# Patient Record
Sex: MALE | Race: WHITE | NOT HISPANIC OR LATINO | URBAN - METROPOLITAN AREA
[De-identification: names, ages, dates, MRNs, and addresses within clinical notes are randomized per-mention and may not be internally consistent; named-entity substitution may affect disease eponyms.]

---

## 2019-05-23 ENCOUNTER — EMERGENCY (EMERGENCY)
Facility: HOSPITAL | Age: 74
LOS: 1 days | Discharge: HOME | End: 2019-05-23
Attending: EMERGENCY MEDICINE | Admitting: EMERGENCY MEDICINE
Payer: MEDICARE

## 2019-05-23 VITALS
HEIGHT: 68 IN | HEART RATE: 77 BPM | SYSTOLIC BLOOD PRESSURE: 145 MMHG | RESPIRATION RATE: 20 BRPM | OXYGEN SATURATION: 95 % | WEIGHT: 227.08 LBS | TEMPERATURE: 99 F | DIASTOLIC BLOOD PRESSURE: 83 MMHG

## 2019-05-23 VITALS
SYSTOLIC BLOOD PRESSURE: 113 MMHG | RESPIRATION RATE: 18 BRPM | DIASTOLIC BLOOD PRESSURE: 66 MMHG | OXYGEN SATURATION: 95 % | HEART RATE: 88 BPM | TEMPERATURE: 98 F

## 2019-05-23 DIAGNOSIS — R06.02 SHORTNESS OF BREATH: ICD-10-CM

## 2019-05-23 DIAGNOSIS — J45.901 UNSPECIFIED ASTHMA WITH (ACUTE) EXACERBATION: ICD-10-CM

## 2019-05-23 LAB
ALBUMIN SERPL ELPH-MCNC: 4 G/DL — SIGNIFICANT CHANGE UP (ref 3.3–5)
ALP SERPL-CCNC: 53 U/L — SIGNIFICANT CHANGE UP (ref 40–120)
ALT FLD-CCNC: 31 U/L — SIGNIFICANT CHANGE UP (ref 10–45)
ANION GAP SERPL CALC-SCNC: 12 MMOL/L — SIGNIFICANT CHANGE UP (ref 5–17)
AST SERPL-CCNC: 22 U/L — SIGNIFICANT CHANGE UP (ref 10–40)
BASOPHILS # BLD AUTO: 0.01 K/UL — SIGNIFICANT CHANGE UP (ref 0–0.2)
BASOPHILS NFR BLD AUTO: 0.2 % — SIGNIFICANT CHANGE UP (ref 0–2)
BILIRUB SERPL-MCNC: 0.4 MG/DL — SIGNIFICANT CHANGE UP (ref 0.2–1.2)
BUN SERPL-MCNC: 11 MG/DL — SIGNIFICANT CHANGE UP (ref 7–23)
CALCIUM SERPL-MCNC: 9.3 MG/DL — SIGNIFICANT CHANGE UP (ref 8.4–10.5)
CHLORIDE SERPL-SCNC: 103 MMOL/L — SIGNIFICANT CHANGE UP (ref 96–108)
CO2 SERPL-SCNC: 23 MMOL/L — SIGNIFICANT CHANGE UP (ref 22–31)
CREAT SERPL-MCNC: 0.68 MG/DL — SIGNIFICANT CHANGE UP (ref 0.5–1.3)
EOSINOPHIL # BLD AUTO: 0.03 K/UL — SIGNIFICANT CHANGE UP (ref 0–0.5)
EOSINOPHIL NFR BLD AUTO: 0.7 % — SIGNIFICANT CHANGE UP (ref 0–6)
GLUCOSE SERPL-MCNC: 119 MG/DL — HIGH (ref 70–99)
HCT VFR BLD CALC: 42 % — SIGNIFICANT CHANGE UP (ref 39–50)
HGB BLD-MCNC: 13.8 G/DL — SIGNIFICANT CHANGE UP (ref 13–17)
IMM GRANULOCYTES NFR BLD AUTO: 0.2 % — SIGNIFICANT CHANGE UP (ref 0–1.5)
LYMPHOCYTES # BLD AUTO: 1.11 K/UL — SIGNIFICANT CHANGE UP (ref 1–3.3)
LYMPHOCYTES # BLD AUTO: 26.5 % — SIGNIFICANT CHANGE UP (ref 13–44)
MCHC RBC-ENTMCNC: 28 PG — SIGNIFICANT CHANGE UP (ref 27–34)
MCHC RBC-ENTMCNC: 32.9 GM/DL — SIGNIFICANT CHANGE UP (ref 32–36)
MCV RBC AUTO: 85.4 FL — SIGNIFICANT CHANGE UP (ref 80–100)
MONOCYTES # BLD AUTO: 0.57 K/UL — SIGNIFICANT CHANGE UP (ref 0–0.9)
MONOCYTES NFR BLD AUTO: 13.6 % — SIGNIFICANT CHANGE UP (ref 2–14)
NEUTROPHILS # BLD AUTO: 2.46 K/UL — SIGNIFICANT CHANGE UP (ref 1.8–7.4)
NEUTROPHILS NFR BLD AUTO: 58.8 % — SIGNIFICANT CHANGE UP (ref 43–77)
NRBC # BLD: 0 /100 WBCS — SIGNIFICANT CHANGE UP (ref 0–0)
PLATELET # BLD AUTO: 176 K/UL — SIGNIFICANT CHANGE UP (ref 150–400)
POTASSIUM SERPL-MCNC: 4.1 MMOL/L — SIGNIFICANT CHANGE UP (ref 3.5–5.3)
POTASSIUM SERPL-SCNC: 4.1 MMOL/L — SIGNIFICANT CHANGE UP (ref 3.5–5.3)
PROT SERPL-MCNC: 7.2 G/DL — SIGNIFICANT CHANGE UP (ref 6–8.3)
RBC # BLD: 4.92 M/UL — SIGNIFICANT CHANGE UP (ref 4.2–5.8)
RBC # FLD: 14.6 % — HIGH (ref 10.3–14.5)
SODIUM SERPL-SCNC: 138 MMOL/L — SIGNIFICANT CHANGE UP (ref 135–145)
WBC # BLD: 4.19 K/UL — SIGNIFICANT CHANGE UP (ref 3.8–10.5)
WBC # FLD AUTO: 4.19 K/UL — SIGNIFICANT CHANGE UP (ref 3.8–10.5)

## 2019-05-23 PROCEDURE — 85025 COMPLETE CBC W/AUTO DIFF WBC: CPT

## 2019-05-23 PROCEDURE — 93010 ELECTROCARDIOGRAM REPORT: CPT

## 2019-05-23 PROCEDURE — 99284 EMERGENCY DEPT VISIT MOD MDM: CPT | Mod: 25

## 2019-05-23 PROCEDURE — 94640 AIRWAY INHALATION TREATMENT: CPT

## 2019-05-23 PROCEDURE — 80053 COMPREHEN METABOLIC PANEL: CPT

## 2019-05-23 PROCEDURE — 71046 X-RAY EXAM CHEST 2 VIEWS: CPT | Mod: 26

## 2019-05-23 PROCEDURE — 36415 COLL VENOUS BLD VENIPUNCTURE: CPT

## 2019-05-23 PROCEDURE — 99285 EMERGENCY DEPT VISIT HI MDM: CPT | Mod: 25

## 2019-05-23 PROCEDURE — 93005 ELECTROCARDIOGRAM TRACING: CPT

## 2019-05-23 PROCEDURE — 96374 THER/PROPH/DIAG INJ IV PUSH: CPT

## 2019-05-23 PROCEDURE — 71046 X-RAY EXAM CHEST 2 VIEWS: CPT

## 2019-05-23 RX ORDER — ALBUTEROL 90 UG/1
2 AEROSOL, METERED ORAL
Qty: 1 | Refills: 0
Start: 2019-05-23 | End: 2019-05-29

## 2019-05-23 RX ORDER — IPRATROPIUM/ALBUTEROL SULFATE 18-103MCG
3 AEROSOL WITH ADAPTER (GRAM) INHALATION
Refills: 0 | Status: COMPLETED | OUTPATIENT
Start: 2019-05-23 | End: 2019-05-23

## 2019-05-23 RX ORDER — CLARITHROMYCIN 500 MG
1 TABLET ORAL
Qty: 6 | Refills: 0
Start: 2019-05-23 | End: 2019-05-27

## 2019-05-23 RX ADMIN — Medication 100 MILLIGRAM(S): at 11:03

## 2019-05-23 RX ADMIN — Medication 3 MILLILITER(S): at 11:06

## 2019-05-23 RX ADMIN — Medication 3 MILLILITER(S): at 11:08

## 2019-05-23 RX ADMIN — Medication 125 MILLIGRAM(S): at 10:51

## 2019-05-23 RX ADMIN — Medication 3 MILLILITER(S): at 11:04
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
## 2019-05-23 NOTE — ED PROVIDER NOTE - MUSCULOSKELETAL, MLM
Spine appears normal, range of motion is not limited, no muscle or joint tenderness; No LE edema b/l

## 2019-05-23 NOTE — ED PROVIDER NOTE - OBJECTIVE STATEMENT
The pt is a 73 y/o M, who presents to ED c/o cough x 1 wk. Pt went on a cruise and developed cough while there, cough getting worse over past wk, now triggering his asthma (induced by working at [employer] yrs ago) - c/o wheezing and chest tightness, has been using his MDi w/min relief. Cough is productive w/clear to yellow sputum, + coughing fits. Denies cp, palpitations, fevers, chills, n/v/d, abd pain, leg pain or swelling

## 2019-05-23 NOTE — ED ADULT NURSE NOTE - OBJECTIVE STATEMENT
Pt alert and oriented X3. pt coming from home due to worsening productive cough that started Saturday. Greenish-clear sputum per pt report. Pt came back from a cruise on Saturday. Pt denies generalized weakness but states yesterday after cooking meal and doing chores felt really weak and fatigue. Pt denies any weakness, fever SOB or chest pain. pt contacted his PCP this morning who recommended him to come to the ER.

## 2019-05-23 NOTE — ED ADULT NURSE NOTE - CHPI ED NUR SYMPTOMS NEG
no shortness of breath/no fever/no headache/no diaphoresis/no chills/no edema/no body aches/no chest pain/no hemoptysis
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
## 2019-05-23 NOTE — ED ADULT NURSE NOTE - NSIMPLEMENTINTERV_GEN_ALL_ED
Implemented All Universal Safety Interventions:  [place] to call system. Call bell, personal items and telephone within reach. Instruct patient to call for assistance. Room bathroom lighting operational. Non-slip footwear when patient is off stretcher. Physically safe environment: no spills, clutter or unnecessary equipment. Stretcher in lowest position, wheels locked, appropriate side rails in place.

## 2019-05-23 NOTE — ED PROVIDER NOTE - CLINICAL SUMMARY MEDICAL DECISION MAKING FREE TEXT BOX
pt w/hx of asthma (induced by working at [employer] - followed at [facility] - has mdi, been on steroids, never intubated). c/o cough and wheezing, no resp distress but + wheezing and rhonchi on initial exam, pt given nebs and steroids and cough meds - much improved, cxr w/o i/e, labs wnl, will dc w/steroid burst and cough meds, will cover w/zpack given duration of symptoms, f/u w/pmd, pt understands and agrees w/plan

## 2019-06-03 PROBLEM — J45.909 UNSPECIFIED ASTHMA, UNCOMPLICATED: Chronic | Status: ACTIVE | Noted: 2019-05-23

## 2019-07-01 ENCOUNTER — APPOINTMENT (OUTPATIENT)
Dept: PULMONOLOGY | Facility: CLINIC | Age: 74
End: 2019-07-01
Payer: MEDICARE

## 2019-07-01 VITALS
OXYGEN SATURATION: 97 % | HEIGHT: 68 IN | SYSTOLIC BLOOD PRESSURE: 128 MMHG | HEART RATE: 115 BPM | WEIGHT: 220 LBS | BODY MASS INDEX: 33.34 KG/M2 | TEMPERATURE: 98.4 F | DIASTOLIC BLOOD PRESSURE: 80 MMHG

## 2019-07-01 DIAGNOSIS — Z87.891 PERSONAL HISTORY OF NICOTINE DEPENDENCE: ICD-10-CM

## 2019-07-01 DIAGNOSIS — J44.9 CHRONIC OBSTRUCTIVE PULMONARY DISEASE, UNSPECIFIED: ICD-10-CM

## 2019-07-01 DIAGNOSIS — Z82.5 FAMILY HISTORY OF ASTHMA AND OTHER CHRONIC LOWER RESPIRATORY DISEASES: ICD-10-CM

## 2019-07-01 PROCEDURE — 99204 OFFICE O/P NEW MOD 45 MIN: CPT | Mod: 25

## 2019-07-01 PROCEDURE — 94010 BREATHING CAPACITY TEST: CPT

## 2019-07-01 RX ORDER — QUINAPRIL HYDROCHLORIDE 5 MG/1
TABLET, FILM COATED ORAL
Refills: 0 | Status: ACTIVE | COMMUNITY

## 2019-07-01 RX ORDER — TAMSULOSIN HYDROCHLORIDE 0.4 MG/1
CAPSULE ORAL
Refills: 0 | Status: ACTIVE | COMMUNITY

## 2019-07-01 RX ORDER — FLUTICASONE FUROATE AND VILANTEROL TRIFENATATE 100; 25 UG/1; UG/1
100-25 POWDER RESPIRATORY (INHALATION)
Refills: 0 | Status: ACTIVE | COMMUNITY

## 2019-07-01 RX ORDER — TIOTROPIUM BROMIDE INHALATION SPRAY 1.56 UG/1
1.25 SPRAY, METERED RESPIRATORY (INHALATION)
Refills: 0 | Status: ACTIVE | COMMUNITY

## 2019-07-01 NOTE — ASSESSMENT
[FreeTextEntry1] : Data reviewed:\par \par Jamie 07/01/2019 : mod obstruction, FEV1 59%\par \par Impression:\par Obstructive lung disease - perhaps asthma-COPD overlap given the history\par \par Plan:\par This pleasant man has obstructive lung disease with a recent exacerbation, but he doesn't have frequent exacerbations and he's asymptomatic today, and he gets his care free of charge at the [facility]. He's being evaluated there in 10 days. I will defer doing any further evaluation now at his expense (ie full PFT). He should get the eval at the [address] [place] [address], and he can return to me any time I can be of service.

## 2019-07-01 NOTE — CONSULT LETTER
[Dear  ___] : Dear  [unfilled], [( Thank you for referring [unfilled] for consultation for _____ )] : Thank you for referring [unfilled] for consultation for [unfilled] [Please see my note below.] : Please see my note below. [Consult Closing:] : Thank you very much for allowing me to participate in the care of this patient.  If you have any questions, please do not hesitate to contact me. [Sincerely,] : Sincerely, [FreeTextEntry2] : Jasper Sutherland MD\par [address]\[place] [address] [FreeTextEntry3] : Kathy Bautista MD, FCCP\par

## 2021-06-10 ENCOUNTER — APPOINTMENT (OUTPATIENT)
Dept: ORTHOPEDIC SURGERY | Facility: CLINIC | Age: 76
End: 2021-06-10
Payer: MEDICARE

## 2021-06-10 VITALS — WEIGHT: 220 LBS | HEIGHT: 68 IN | BODY MASS INDEX: 33.34 KG/M2

## 2021-06-10 DIAGNOSIS — Z87.898 PERSONAL HISTORY OF OTHER SPECIFIED CONDITIONS: ICD-10-CM

## 2021-06-10 DIAGNOSIS — Z78.9 OTHER SPECIFIED HEALTH STATUS: ICD-10-CM

## 2021-06-10 DIAGNOSIS — M65.342 TRIGGER FINGER, LEFT RING FINGER: ICD-10-CM

## 2021-06-10 PROCEDURE — 73120 X-RAY EXAM OF HAND: CPT | Mod: 50

## 2021-06-10 PROCEDURE — 99203 OFFICE O/P NEW LOW 30 MIN: CPT | Mod: 25

## 2021-06-10 PROCEDURE — 20550 NJX 1 TENDON SHEATH/LIGAMENT: CPT | Mod: F3

## 2021-06-10 RX ORDER — UMECLIDINIUM 62.5 UG/1
AEROSOL, POWDER ORAL
Refills: 0 | Status: ACTIVE | COMMUNITY

## 2021-06-10 RX ORDER — FLUTICASONE FUROATE AND VILANTEROL TRIFENATATE 200; 25 UG/1; UG/1
200-25 POWDER RESPIRATORY (INHALATION)
Refills: 0 | Status: ACTIVE | COMMUNITY

## 2021-10-06 PROBLEM — J44.9 OBSTRUCTIVE LUNG DISEASE: Status: ACTIVE | Noted: 2019-07-01

## 2021-12-01 ENCOUNTER — APPOINTMENT (OUTPATIENT)
Dept: ORTHOPEDIC SURGERY | Facility: CLINIC | Age: 76
End: 2021-12-01
Payer: MEDICARE

## 2021-12-01 VITALS — WEIGHT: 220 LBS | RESPIRATION RATE: 14 BRPM | HEIGHT: 68 IN | BODY MASS INDEX: 33.34 KG/M2

## 2021-12-01 DIAGNOSIS — M65.342 TRIGGER FINGER, LEFT RING FINGER: ICD-10-CM

## 2021-12-01 PROCEDURE — 20550 NJX 1 TENDON SHEATH/LIGAMENT: CPT | Mod: F3

## 2021-12-01 PROCEDURE — 99213 OFFICE O/P EST LOW 20 MIN: CPT | Mod: 25

## 2021-12-04 ENCOUNTER — EMERGENCY (EMERGENCY)
Facility: HOSPITAL | Age: 76
LOS: 1 days | Discharge: HOME | End: 2021-12-04
Attending: EMERGENCY MEDICINE | Admitting: EMERGENCY MEDICINE
Payer: MEDICARE

## 2021-12-04 VITALS
HEART RATE: 116 BPM | OXYGEN SATURATION: 97 % | SYSTOLIC BLOOD PRESSURE: 114 MMHG | TEMPERATURE: 98 F | DIASTOLIC BLOOD PRESSURE: 83 MMHG | RESPIRATION RATE: 18 BRPM

## 2021-12-04 VITALS
TEMPERATURE: 98 F | DIASTOLIC BLOOD PRESSURE: 98 MMHG | HEIGHT: 68 IN | SYSTOLIC BLOOD PRESSURE: 164 MMHG | OXYGEN SATURATION: 98 % | RESPIRATION RATE: 16 BRPM | HEART RATE: 104 BPM | WEIGHT: 220.02 LBS

## 2021-12-04 DIAGNOSIS — K59.00 CONSTIPATION, UNSPECIFIED: ICD-10-CM

## 2021-12-04 DIAGNOSIS — J45.909 UNSPECIFIED ASTHMA, UNCOMPLICATED: ICD-10-CM

## 2021-12-04 DIAGNOSIS — R10.30 LOWER ABDOMINAL PAIN, UNSPECIFIED: ICD-10-CM

## 2021-12-04 DIAGNOSIS — R11.10 VOMITING, UNSPECIFIED: ICD-10-CM

## 2021-12-04 DIAGNOSIS — I10 ESSENTIAL (PRIMARY) HYPERTENSION: ICD-10-CM

## 2021-12-04 DIAGNOSIS — R14.0 ABDOMINAL DISTENSION (GASEOUS): ICD-10-CM

## 2021-12-04 PROCEDURE — 74019 RADEX ABDOMEN 2 VIEWS: CPT | Mod: 26

## 2021-12-04 PROCEDURE — 99283 EMERGENCY DEPT VISIT LOW MDM: CPT | Mod: 25

## 2021-12-04 RX ORDER — GLYCERIN ADULT
1 SUPPOSITORY, RECTAL RECTAL ONCE
Refills: 0 | Status: COMPLETED | OUTPATIENT
Start: 2021-12-04 | End: 2021-12-04

## 2021-12-04 RX ORDER — MULTIVIT WITH MIN/MFOLATE/K2 340-15/3 G
1 POWDER (GRAM) ORAL ONCE
Refills: 0 | Status: COMPLETED | OUTPATIENT
Start: 2021-12-04 | End: 2021-12-04

## 2021-12-04 RX ADMIN — Medication 1 BOTTLE: at 11:11

## 2021-12-04 RX ADMIN — Medication 1 SUPPOSITORY(S): at 11:42

## 2021-12-04 NOTE — ED PROVIDER NOTE - NSFOLLOWUPINSTRUCTIONS_ED_ALL_ED_FT
May use Miralax 17g daily for one week.    Constipation    Constipation is when a person has fewer than three bowel movements a week, has difficulty having a bowel movement, or has stools that are dry, hard, or larger than normal. Other symptoms can include abdominal pain or bloating. As people grow older, constipation is more common. A low-fiber diet, not taking in enough fluids, and taking certain medicines, including opioid painkillers, may make constipation worse. Treatment varies but may include dietary modifications (more fiber-rich foods), lifestyle modifications, and possible medications.     SEEK IMMEDIATE MEDICAL CARE IF YOU HAVE ANY OF THE FOLLOWING SYMPTOMS: bright red blood in your stool, constipation for longer than 4 days, abdominal or rectal pain, unexplained weight loss, or inability to pass gas.

## 2021-12-04 NOTE — ED PROVIDER NOTE - OBJECTIVE STATEMENT
75 yo hx of asthma, htn with complaints of constipation for three days, has been passing flatus, last this am. No vomiting, some mild lower abd discomfort. Has tried one tab of colace, two tabs of dulcolace and water enema. Last time pt  has these symptoms was on a keto diet, not on it currently. no GI bleed sx, fever, abdominal surgeries. 75 yo hx of asthma, htn with complaints of constipation for three days, has been passing flatus, last this am. No vomiting, some mild lower abd discomfort. Has tried one tab of colace, two tabs of Dulcolax and water enema. Last time pt  has these symptoms was on a keto diet, not on it currently. no GI bleed sx, fever, abdominal surgeries.

## 2021-12-04 NOTE — ED PROVIDER NOTE - PATIENT PORTAL LINK FT
You can access the FollowMyHealth Patient Portal offered by [facility] by registering at the following website: [email or web address]. By joining [facility]’s FollowMyHealth portal, you will also be able to view your health information using other applications (apps) compatible with our system.

## 2021-12-04 NOTE — ED ADULT NURSE NOTE - NSFALLRSKASSESSTYPE_ED_ALL_ED
Initial (On Arrival) Carac Counseling:  I discussed with the patient the risks of Carac including but not limited to erythema, scaling, itching, weeping, crusting, and pain.

## 2021-12-04 NOTE — ED PROVIDER NOTE - CLINICAL SUMMARY MEDICAL DECISION MAKING FREE TEXT BOX
Pt w constipation, no air fluid levels on XR, no active vomiting,abd soft, will trial meds- Mgcitrate/enema and reassess.

## 2021-12-04 NOTE — ED PROVIDER NOTE - PROGRESS NOTE DETAILS
Pt w bm, no vomiting in ED, states some abd pain in lower abd resolved. no stool in vault, will advise to continue to use Miralax daily, diet instructions given.

## 2021-12-04 NOTE — ED ADULT TRIAGE NOTE - CHIEF COMPLAINT QUOTE
Pt CO Constipation x3 days.  Pt states "I usually go about everyday and the last time this happened was when I tried a Keto diet and I got blocked for several days."  Denies N/V/d, SOB, Fevers, CP and Dizziness.

## 2021-12-06 ENCOUNTER — INPATIENT (INPATIENT)
Facility: HOSPITAL | Age: 76
LOS: 3 days | Discharge: HOME | DRG: 345 | End: 2021-12-10
Attending: SURGERY | Admitting: SURGERY
Payer: MEDICARE

## 2021-12-06 ENCOUNTER — TRANSCRIPTION ENCOUNTER (OUTPATIENT)
Age: 76
End: 2021-12-06

## 2021-12-06 VITALS
TEMPERATURE: 98 F | SYSTOLIC BLOOD PRESSURE: 181 MMHG | DIASTOLIC BLOOD PRESSURE: 102 MMHG | HEIGHT: 68 IN | RESPIRATION RATE: 18 BRPM | WEIGHT: 220.02 LBS | HEART RATE: 114 BPM | OXYGEN SATURATION: 96 %

## 2021-12-06 LAB
ALBUMIN SERPL ELPH-MCNC: 4.4 G/DL — SIGNIFICANT CHANGE UP (ref 3.3–5)
ALP SERPL-CCNC: 57 U/L — SIGNIFICANT CHANGE UP (ref 40–120)
ALT FLD-CCNC: 16 U/L — SIGNIFICANT CHANGE UP (ref 10–45)
ANION GAP SERPL CALC-SCNC: 16 MMOL/L — SIGNIFICANT CHANGE UP (ref 5–17)
APPEARANCE UR: CLEAR — SIGNIFICANT CHANGE UP
APTT BLD: 28 SEC — SIGNIFICANT CHANGE UP (ref 27.5–35.5)
AST SERPL-CCNC: 14 U/L — SIGNIFICANT CHANGE UP (ref 10–40)
BASOPHILS # BLD AUTO: 0.02 K/UL — SIGNIFICANT CHANGE UP (ref 0–0.2)
BASOPHILS NFR BLD AUTO: 0.2 % — SIGNIFICANT CHANGE UP (ref 0–2)
BILIRUB SERPL-MCNC: 0.7 MG/DL — SIGNIFICANT CHANGE UP (ref 0.2–1.2)
BILIRUB UR-MCNC: NEGATIVE — SIGNIFICANT CHANGE UP
BLD GP AB SCN SERPL QL: NEGATIVE — SIGNIFICANT CHANGE UP
BUN SERPL-MCNC: 14 MG/DL — SIGNIFICANT CHANGE UP (ref 7–23)
CALCIUM SERPL-MCNC: 9.4 MG/DL — SIGNIFICANT CHANGE UP (ref 8.4–10.5)
CHLORIDE SERPL-SCNC: 103 MMOL/L — SIGNIFICANT CHANGE UP (ref 96–108)
CO2 SERPL-SCNC: 21 MMOL/L — LOW (ref 22–31)
COLOR SPEC: YELLOW — SIGNIFICANT CHANGE UP
CREAT SERPL-MCNC: 0.73 MG/DL — SIGNIFICANT CHANGE UP (ref 0.5–1.3)
DIFF PNL FLD: NEGATIVE — SIGNIFICANT CHANGE UP
EOSINOPHIL # BLD AUTO: 0.01 K/UL — SIGNIFICANT CHANGE UP (ref 0–0.5)
EOSINOPHIL NFR BLD AUTO: 0.1 % — SIGNIFICANT CHANGE UP (ref 0–6)
GLUCOSE SERPL-MCNC: 136 MG/DL — HIGH (ref 70–99)
GLUCOSE UR QL: NEGATIVE — SIGNIFICANT CHANGE UP
HCT VFR BLD CALC: 48.3 % — SIGNIFICANT CHANGE UP (ref 39–50)
HGB BLD-MCNC: 16.2 G/DL — SIGNIFICANT CHANGE UP (ref 13–17)
IMM GRANULOCYTES NFR BLD AUTO: 0.3 % — SIGNIFICANT CHANGE UP (ref 0–1.5)
INR BLD: 1.1 — SIGNIFICANT CHANGE UP (ref 0.88–1.16)
KETONES UR-MCNC: ABNORMAL MG/DL
LEUKOCYTE ESTERASE UR-ACNC: NEGATIVE — SIGNIFICANT CHANGE UP
LIDOCAIN IGE QN: 18 U/L — SIGNIFICANT CHANGE UP (ref 7–60)
LYMPHOCYTES # BLD AUTO: 1.26 K/UL — SIGNIFICANT CHANGE UP (ref 1–3.3)
LYMPHOCYTES # BLD AUTO: 13.3 % — SIGNIFICANT CHANGE UP (ref 13–44)
MCHC RBC-ENTMCNC: 27.7 PG — SIGNIFICANT CHANGE UP (ref 27–34)
MCHC RBC-ENTMCNC: 33.5 GM/DL — SIGNIFICANT CHANGE UP (ref 32–36)
MCV RBC AUTO: 82.6 FL — SIGNIFICANT CHANGE UP (ref 80–100)
MONOCYTES # BLD AUTO: 0.77 K/UL — SIGNIFICANT CHANGE UP (ref 0–0.9)
MONOCYTES NFR BLD AUTO: 8.1 % — SIGNIFICANT CHANGE UP (ref 2–14)
NEUTROPHILS # BLD AUTO: 7.36 K/UL — SIGNIFICANT CHANGE UP (ref 1.8–7.4)
NEUTROPHILS NFR BLD AUTO: 78 % — HIGH (ref 43–77)
NITRITE UR-MCNC: NEGATIVE — SIGNIFICANT CHANGE UP
NRBC # BLD: 0 /100 WBCS — SIGNIFICANT CHANGE UP (ref 0–0)
PH UR: 6 — SIGNIFICANT CHANGE UP (ref 5–8)
PLATELET # BLD AUTO: 254 K/UL — SIGNIFICANT CHANGE UP (ref 150–400)
POTASSIUM SERPL-MCNC: 3.3 MMOL/L — LOW (ref 3.5–5.3)
POTASSIUM SERPL-SCNC: 3.3 MMOL/L — LOW (ref 3.5–5.3)
PROT SERPL-MCNC: 7.3 G/DL — SIGNIFICANT CHANGE UP (ref 6–8.3)
PROT UR-MCNC: NEGATIVE MG/DL — SIGNIFICANT CHANGE UP
PROTHROM AB SERPL-ACNC: 13.1 SEC — SIGNIFICANT CHANGE UP (ref 10.6–13.6)
RBC # BLD: 5.85 M/UL — HIGH (ref 4.2–5.8)
RBC # FLD: 14.2 % — SIGNIFICANT CHANGE UP (ref 10.3–14.5)
RH IG SCN BLD-IMP: POSITIVE — SIGNIFICANT CHANGE UP
SARS-COV-2 RNA SPEC QL NAA+PROBE: NEGATIVE — SIGNIFICANT CHANGE UP
SODIUM SERPL-SCNC: 140 MMOL/L — SIGNIFICANT CHANGE UP (ref 135–145)
SP GR SPEC: 1.01 — SIGNIFICANT CHANGE UP (ref 1–1.03)
UROBILINOGEN FLD QL: 0.2 E.U./DL — SIGNIFICANT CHANGE UP
WBC # BLD: 9.45 K/UL — SIGNIFICANT CHANGE UP (ref 3.8–10.5)
WBC # FLD AUTO: 9.45 K/UL — SIGNIFICANT CHANGE UP (ref 3.8–10.5)

## 2021-12-06 PROCEDURE — 99285 EMERGENCY DEPT VISIT HI MDM: CPT

## 2021-12-06 PROCEDURE — 74177 CT ABD & PELVIS W/CONTRAST: CPT | Mod: 26,MB

## 2021-12-06 PROCEDURE — 71046 X-RAY EXAM CHEST 2 VIEWS: CPT | Mod: 26

## 2021-12-06 RX ORDER — LISINOPRIL 2.5 MG/1
10 TABLET ORAL ONCE
Refills: 0 | Status: COMPLETED | OUTPATIENT
Start: 2021-12-06 | End: 2021-12-06

## 2021-12-06 RX ORDER — FAMOTIDINE 10 MG/ML
20 INJECTION INTRAVENOUS ONCE
Refills: 0 | Status: COMPLETED | OUTPATIENT
Start: 2021-12-06 | End: 2021-12-06

## 2021-12-06 RX ORDER — HEPARIN SODIUM 5000 [USP'U]/ML
5000 INJECTION INTRAVENOUS; SUBCUTANEOUS EVERY 8 HOURS
Refills: 0 | Status: DISCONTINUED | OUTPATIENT
Start: 2021-12-06 | End: 2021-12-10

## 2021-12-06 RX ORDER — TIOTROPIUM BROMIDE 18 UG/1
0 CAPSULE ORAL; RESPIRATORY (INHALATION)
Qty: 0 | Refills: 0 | DISCHARGE

## 2021-12-06 RX ORDER — TAMSULOSIN HYDROCHLORIDE 0.4 MG/1
0.4 CAPSULE ORAL AT BEDTIME
Refills: 0 | Status: DISCONTINUED | OUTPATIENT
Start: 2021-12-06 | End: 2021-12-10

## 2021-12-06 RX ORDER — HYDROMORPHONE HYDROCHLORIDE 2 MG/ML
1 INJECTION INTRAMUSCULAR; INTRAVENOUS; SUBCUTANEOUS EVERY 4 HOURS
Refills: 0 | Status: DISCONTINUED | OUTPATIENT
Start: 2021-12-06 | End: 2021-12-08

## 2021-12-06 RX ORDER — POTASSIUM CHLORIDE 20 MEQ
10 PACKET (EA) ORAL
Refills: 0 | Status: COMPLETED | OUTPATIENT
Start: 2021-12-06 | End: 2021-12-06

## 2021-12-06 RX ORDER — METOPROLOL TARTRATE 50 MG
2.5 TABLET ORAL EVERY 6 HOURS
Refills: 0 | Status: DISCONTINUED | OUTPATIENT
Start: 2021-12-06 | End: 2021-12-07

## 2021-12-06 RX ORDER — SODIUM CHLORIDE 9 MG/ML
1000 INJECTION, SOLUTION INTRAVENOUS
Refills: 0 | Status: DISCONTINUED | OUTPATIENT
Start: 2021-12-06 | End: 2021-12-07

## 2021-12-06 RX ORDER — ACETAMINOPHEN 500 MG
1000 TABLET ORAL ONCE
Refills: 0 | Status: COMPLETED | OUTPATIENT
Start: 2021-12-06 | End: 2021-12-06

## 2021-12-06 RX ORDER — QUINAPRIL HYDROCHLORIDE 40 MG/1
0 TABLET, FILM COATED ORAL
Qty: 0 | Refills: 0 | DISCHARGE

## 2021-12-06 RX ORDER — IPRATROPIUM/ALBUTEROL SULFATE 18-103MCG
3 AEROSOL WITH ADAPTER (GRAM) INHALATION EVERY 6 HOURS
Refills: 0 | Status: DISCONTINUED | OUTPATIENT
Start: 2021-12-06 | End: 2021-12-10

## 2021-12-06 RX ORDER — SODIUM CHLORIDE 9 MG/ML
500 INJECTION INTRAMUSCULAR; INTRAVENOUS; SUBCUTANEOUS ONCE
Refills: 0 | Status: COMPLETED | OUTPATIENT
Start: 2021-12-06 | End: 2021-12-06

## 2021-12-06 RX ORDER — POTASSIUM CHLORIDE 20 MEQ
10 PACKET (EA) ORAL ONCE
Refills: 0 | Status: COMPLETED | OUTPATIENT
Start: 2021-12-06 | End: 2021-12-06

## 2021-12-06 RX ORDER — ONDANSETRON 8 MG/1
4 TABLET, FILM COATED ORAL ONCE
Refills: 0 | Status: COMPLETED | OUTPATIENT
Start: 2021-12-06 | End: 2021-12-06

## 2021-12-06 RX ORDER — IOHEXOL 300 MG/ML
30 INJECTION, SOLUTION INTRAVENOUS ONCE
Refills: 0 | Status: COMPLETED | OUTPATIENT
Start: 2021-12-06 | End: 2021-12-06

## 2021-12-06 RX ORDER — ONDANSETRON 8 MG/1
4 TABLET, FILM COATED ORAL EVERY 6 HOURS
Refills: 0 | Status: DISCONTINUED | OUTPATIENT
Start: 2021-12-06 | End: 2021-12-10

## 2021-12-06 RX ORDER — ALBUTEROL 90 UG/1
1 AEROSOL, METERED ORAL DAILY
Refills: 0 | Status: DISCONTINUED | OUTPATIENT
Start: 2021-12-06 | End: 2021-12-10

## 2021-12-06 RX ADMIN — LISINOPRIL 10 MILLIGRAM(S): 2.5 TABLET ORAL at 18:00

## 2021-12-06 RX ADMIN — TAMSULOSIN HYDROCHLORIDE 0.4 MILLIGRAM(S): 0.4 CAPSULE ORAL at 22:39

## 2021-12-06 RX ADMIN — ONDANSETRON 4 MILLIGRAM(S): 8 TABLET, FILM COATED ORAL at 16:06

## 2021-12-06 RX ADMIN — Medication 100 MILLIEQUIVALENT(S): at 22:38

## 2021-12-06 RX ADMIN — SODIUM CHLORIDE 140 MILLILITER(S): 9 INJECTION, SOLUTION INTRAVENOUS at 21:19

## 2021-12-06 RX ADMIN — HEPARIN SODIUM 5000 UNIT(S): 5000 INJECTION INTRAVENOUS; SUBCUTANEOUS at 22:39

## 2021-12-06 RX ADMIN — ONDANSETRON 4 MILLIGRAM(S): 8 TABLET, FILM COATED ORAL at 14:54

## 2021-12-06 RX ADMIN — FAMOTIDINE 20 MILLIGRAM(S): 10 INJECTION INTRAVENOUS at 16:07

## 2021-12-06 RX ADMIN — IOHEXOL 30 MILLILITER(S): 300 INJECTION, SOLUTION INTRAVENOUS at 15:01

## 2021-12-06 RX ADMIN — Medication 1 ENEMA: at 20:13

## 2021-12-06 RX ADMIN — SODIUM CHLORIDE 500 MILLILITER(S): 9 INJECTION INTRAMUSCULAR; INTRAVENOUS; SUBCUTANEOUS at 14:54

## 2021-12-06 RX ADMIN — Medication 100 MILLIEQUIVALENT(S): at 16:06

## 2021-12-06 RX ADMIN — Medication 400 MILLIGRAM(S): at 22:38

## 2021-12-06 RX ADMIN — Medication 100 MILLIEQUIVALENT(S): at 20:47

## 2021-12-06 NOTE — CONSULT NOTE ADULT - SUBJECTIVE AND OBJECTIVE BOX
Interventional Cardiology Adult Consult Note    Subjective Assessment:  No active chest pain or dyspnea.   still with some abdominal pain, episodic.  No past cardiac history  	  MEDICATIONS:  tamsulosin 0.4 milliGRAM(s) Oral at bedtime      ALBUTerol    90 MICROgram(s) HFA Inhaler 1 Puff(s) Inhalation daily  albuterol/ipratropium for Nebulization 3 milliLiter(s) Nebulizer every 6 hours    acetaminophen   IVPB .. 1000 milliGRAM(s) IV Intermittent once PRN  HYDROmorphone  Injectable 1 milliGRAM(s) IV Push every 4 hours PRN  ondansetron Injectable 4 milliGRAM(s) IV Push every 6 hours PRN    saline laxative (FLEET) Rectal Enema 1 Enema Rectal at bedtime      heparin   Injectable 5000 Unit(s) SubCutaneous every 8 hours  lactated ringers. 1000 milliLiter(s) IV Continuous <Continuous>  potassium chloride  10 mEq/100 mL IVPB 10 milliEquivalent(s) IV Intermittent every 1 hour      	    [PHYSICAL EXAM:  TELEMETRY:  T(C): 37.3 (12-06-21 @ 22:00), Max: 37.7 (12-06-21 @ 18:05)  HR: 100 (12-06-21 @ 21:55) (88 - 114)  BP: 137/98 (12-06-21 @ 21:55) (136/78 - 181/102)  RR: 18 (12-06-21 @ 21:55) (16 - 18)  SpO2: 95% (12-06-21 @ 21:55) (95% - 98%)  Wt(kg): --  I&O's Summary    Height (cm): 172.7 (12-06 @ 13:50)  Weight (kg): 99.8 (12-06 @ 13:50)  BMI (kg/m2): 33.5 (12-06 @ 13:50)  BSA (m2): 2.13 (12-06 @ 13:50)  [place]:  Central/PICC/Mid Line:                                         Appearance: Normal	  HEENT:   Normal oral mucosa, PERRL, EOMI	  Neck: Supple, - JVD; Carotid Bruit   Cardiovascular: Normal S1 S2, No JVD, No murmurs,   Respiratory: Lungs clear to auscultation  Extremities: Normal range of motion, No edema    	    ECG:  	NSR, 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

## 2021-12-06 NOTE — H&P ADULT - NSHPPHYSICALEXAM_GEN_ALL_CORE
Vital Signs Last 24 Hrs  T(C): 37.7 (06 Dec 2021 18:05), Max: 37.7 (06 Dec 2021 18:05)  T(F): 99.8 (06 Dec 2021 18:05), Max: 99.8 (06 Dec 2021 18:05)  HR: 97 (06 Dec 2021 18:05) (97 - 114)  BP: 151/81 (06 Dec 2021 18:05) (151/81 - 181/102)  BP(mean): --  RR: 18 (06 Dec 2021 18:05) (18 - 18)  SpO2: 96% (06 Dec 2021 18:05) (96% - 96%)    appear uncomfortable due to abdominal distension   nonlabored breathing   CVS: NSR  abd: very distended to the point that his umbilicus is flat, no tenderness, no surgical scars.   Ext: WWP

## 2021-12-06 NOTE — H&P ADULT - NSHPLABSRESULTS_GEN_ALL_CORE
LABS:                        16.2   9.45  )-----------( 254      ( 06 Dec 2021 14:44 )             48.3     12-    140  |  103  |  14  ----------------------------<  136<H>  3.3<L>   |  21<L>  |  0.73    Ca    9.4      06 Dec 2021 14:44    TPro  7.3  /  Alb  4.4  /  TBili  0.7  /  DBili  x   /  AST  14  /  ALT  16  /  AlkPhos  57  12-06    PT/INR - ( 06 Dec 2021 14:44 )   PT: 13.1 sec;   INR: 1.10          PTT - ( 06 Dec 2021 14:44 )  PTT:28.0 sec  Urinalysis Basic - ( 06 Dec 2021 17:33 )    Color: Yellow / Appearance: Clear / S.010 / pH: x  Gluc: x / Ketone: Trace mg/dL  / Bili: Negative / Urobili: 0.2 E.U./dL   Blood: x / Protein: NEGATIVE mg/dL / Nitrite: NEGATIVE   Leuk Esterase: NEGATIVE / RBC: x / WBC x   Sq Epi: x / Non Sq Epi: x / Bacteria: x        RADIOLOGY & ADDITIONAL STUDIES:  CT Abdomen and Pelvis w/ Oral Cont and w/ IV Cont:   EXAM:  CT ABDOMEN AND PELVIS OC IC                          PROCEDURE DATE:  2021          INTERPRETATION:  CT SCAN OF ABDOMEN AND PELVIS    History: Nausea, vomiting, abdominal pain. No bowel movement for one week.    TECHNIQUE: CT of the abdomen and pelvis was performed from lung bases through symphysis pubis. Intravenous and oral contrast material were utilized. Axial, sagittal and coronal images were produced and reviewed. 100 cc of Isovue-370 were administered.    Comparison: None.    Findings:    Lower chest: Small linear atelectasis versus scarring in the bilateral lung bases. Mild cylindrical bronchiectasis. Coronary artery calcifications. Mild wall thickening of distal esophagus.    Liver:  Hepatic steatosis.    Gallbladder: No radiopaque stones gallbladder.    Spleen:  Mild splenomegaly, 13.5 cm.    Pancreas:  Fatty atrophy of the pancreas. Punctate calcification in the pancreatic head. 1.2 x 1.0 cm cystic lesion posterior to the pancreatic body (series 3 image 48). Could represent sidebranch IPMN. Follow-up with CT or MR in one year. No ductal dilatation.    Adrenal glands:  Normal.    Kidneys: Right renal cysts, with dominant lesion measuring 5.0 cm right lower pole. The latter has subtle wall calcification. Few too small to characterize left renal cortical hypodensities. No hydronephrosis.    Adenopathy:  No lymphadenopathy in abdomen or pelvis.    Ascites: None.    Gastrointestinal tract: Normal small bowel. Normal appendix. The colon is diffusely dilated andfluid-filled, with the cecum measuring up to 11.6 cm and the transverse colon measuring 7.4 cm in caliber. A transition point is noted at the distal sigmoid colon where there is increased soft tissue density measuring 6.4 cm in length (series 3 [id number]), suspicious for mass. Mild sigmoid diverticulosis is noted with trace pericolonic stranding. Trace fat stranding is also noted along the ascending colon. No additional sites of significant colonic wall thickening are noted. No mesenteric edema or pneumoperitoneum.    Vessels: Normal caliber aorta. Mild to moderate atherosclerotic plaque.    Pelvic organs: Mildly enlarged prostate. Unremarkable urinary bladder.    Soft tissues: Normal.    Bones: Degenerative changes of the spine with grade 1 anterolisthesis L4 on L5.    Impression:  1.  Large bowel obstruction, secondary to an annular/apple core lesion within the distal sigmoid colon which is suspicious for neoplasm-colonic adenocarcinoma. Correlation with colonoscopy is recommended. Cecal diameter 11.6 cm. Competent ileocecal valve.    --- End of Report ---

## 2021-12-06 NOTE — H&P ADULT - HISTORY OF PRESENT ILLNESS
77 yo M PMH of asthma, HTN presented for abdominal distension, not being able to pass a decent bowel movement or gas in 3-4 days. He also endorsed abdominal distension, discomfort and diffuse pain. He denied nausea or vomiting but been hiccuping. He denied any personal or family hx of colon cancer or any cancer for that matter. His last Cscope was 2 years ago and reportedly was normal. Off note, he denied any prior abdominal surgeries.     In the ED, he was tachycardiac to 115 and systolic 180 mmHg as he skipped his home BP medications. His labs are unremarkable. CT showed distal sigmoid obstruction/apple core lesion. Maximum cecal diameter is 11cm.

## 2021-12-06 NOTE — ED PROVIDER NOTE - CLINICAL SUMMARY MEDICAL DECISION MAKING FREE TEXT BOX
Pt c/o abd pain, distension, constipation.  AXR 2 d ago w dilated bowel loops, lg stool on my read.  Sx and xray concerning for obstruction - ? 2/2 fecal impaction, mass, no hernia on exam, no prior abd surg.  Plan labs, ivf, ct abd; pt declined pain meds.  Pt has h/o htn, did not take bp meds today - denies cp, ha.  BP elevated - will give home bp meds, monitor bp.

## 2021-12-06 NOTE — ED PROVIDER NOTE - OBJECTIVE STATEMENT
75 yo male h/o htn, asthma, bph c/o lower abd pain/pressure 4/10, constipation.  Last bm last wk.  No flatus x several days, + increased distension.  Pt seen 12/4 for same, diagnosed w constipation w/o fecal impaction on exam, given mag citrate and enema in ed w small bm and sent home to use miralax.  Pt notes some leakage of fluid w/o actual bm and no relief of sx.  No fever, n/v. + hiccups when trying to talk.  No prior abd surg, h/o hernia.  No urinary sx.  Pt reports nl colonoscopy ~ 2 yr ago.  Pt's gi, Dr Curry, instructed him to come to ed for eval.

## 2021-12-06 NOTE — PROGRESS NOTE ADULT - SUBJECTIVE AND OBJECTIVE BOX
PT. seen and examined at bed side.    Case discussed with Surgical team.    Case discussed with Consultants.    Orders reviewed.      ICU Vital Signs Last 24 Hrs  T(C): 37.7 (06 Dec 2021 18:05), Max: 37.7 (06 Dec 2021 18:05)  T(F): 99.8 (06 Dec 2021 18:05), Max: 99.8 (06 Dec 2021 18:05)  HR: 97 (06 Dec 2021 18:05) (97 - 114)  BP: 151/81 (06 Dec 2021 18:05) (151/81 - 181/102)  BP(mean): --  ABP: --  ABP(mean): --  RR: 18 (06 Dec 2021 18:05) (18 - 18)  SpO2: 96% (06 Dec 2021 18:05) (96% - 96%)      < from: CT Abdomen and Pelvis w/ Oral Cont and w/ IV Cont (12.06.21 @ 16:45) >  Impression:  1.  Large bowel obstruction, secondary to an annular/apple core lesion within the distal sigmoid colon which is suspicious for neoplasm-colonic adenocarcinoma. Correlation with colonoscopy is recommended. Cecal diameter 11.6 cm. Competent ileocecal valve.      < end of copied text >      · HPI Objective Statement: 75 yo male h/o htn, asthma, bph c/o lower abd pain/pressure 4/10, constipation.  Last bm last wk.  No flatus x several days, + increased distension.  Pt seen 12/4 for same, diagnosed w constipation w/o fecal impaction on exam, given mag citrate and enema in ed w small bm and sent home to use miralax.  Pt notes some leakage of fluid w/o actual bm and no relief of sx.  No fever, n/v. + hiccups when trying to talk.  No prior abd surg, h/o hernia.  No urinary sx.  Pt reports nl colonoscopy ~ 2 yr ago.  Pt's gi, Dr Curry, instructed him to come to ed for eval.      PAST MEDICAL/SURGICAL/FAMILY/SOCIAL HISTORY:   Past Medical, Past Surgical, and Family History:  PAST MEDICAL HISTORY:  Asthma     History of BPH     Hypertension.    Tre/Omar called    D/W

## 2021-12-06 NOTE — ED ADULT TRIAGE NOTE - CHIEF COMPLAINT QUOTE
Pt complaining of lower abd pain and constipation. Last BM wednesday of last week. Pt was seen Saturday, was given magnesium citrate, miralax, enema and a suppository. pt reports he had a very small BM, however no full BM since. Pt denies any N/V. Pt also reports episodes of small amount of liquid dirrhea.

## 2021-12-06 NOTE — ED PROVIDER NOTE - PROGRESS NOTE DETAILS
Labs wnl.  CT verbal review w radiology resident - lbo, 2/2 poss mass vs stricture vs nonspecific soft tissue changes.  Await final read.  Surg consulted.  Pt signed out to Dr Dunbar. Labs wnl.  CT verbal review w radiology resident - lbo, 2/2 poss mass vs stricture vs nonspecific soft tissue changes.  Await final read.  Surg consulted.  Pt signed out to Dr Dunbar.  Pt's PMD paged and his GI texted (no answer on cell and vm full) to update. Pt discussed w pmd, Dr Sutherland - requests Dr Shaikh; surg updated w request.

## 2021-12-06 NOTE — ED ADULT TRIAGE NOTE - WEIGHT METHOD
Received UACS results.  Per Dr Singletary pt to have keflex 250mg tid x3 days.      Pt has order to take 250mg keflex tid for the first 7 days of every month.  Do you want this in addition to that?   stated

## 2021-12-06 NOTE — ED ADULT NURSE NOTE - OBJECTIVE STATEMENT
77yo Male c/o Abdominal Pain . Stated was here Saturday for Constipation - 12/4/21  given enema/ miralax and mag citrate with minimal improvement. Denies any N/V/D.

## 2021-12-06 NOTE — ED ADULT NURSE REASSESSMENT NOTE - NS ED NURSE REASSESS COMMENT FT1
PT with bm after 2nd fleet enema, pt states he was able to stool a good amount and feels more relaxed. will continue to assess patient.

## 2021-12-06 NOTE — H&P ADULT - ATTENDING COMMENTS
Patient seen and examined, imaging reviewed.    Has crampy pain every 45 minutes.  Last flatus was about 24h ago.  Spoke with Dr. Curry, had poor prep colonoscopy 2018, repeated with some improvement, but saw only diverticulosis in sigmoid colon.    AFVSS  NAD  Distended, soft, non-tender.    Labs ok    CT demonstrates large bowel obstruction at sigmoid colon.  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  Not acutely ill, but has competent ileocecal valve.  1. NPO, IVF  2. Stent per GI.   3. If unsuccessful or unable to perform stent, will need diverting colostomy.  4. EKG changes noted and discussed with Dr. Salazar.  If he needs operation today it will be on an urgent or emergent basis.  Echocardiogram in the meantime.  5.  Discussed plan, possible outcomes including eventual operation to remove source of obstruction with patient and partner.

## 2021-12-06 NOTE — H&P ADULT - ASSESSMENT
75 yo M PMH of asthma, HTN admitted for large bowel obstruction    admit to tele under Dr. Munoz  NPO, IVF  fleet enema   GI called ==> Cscope tomorrow first thing in the morning   pain meds  SCD, SQH

## 2021-12-07 LAB
ANION GAP SERPL CALC-SCNC: 15 MMOL/L — SIGNIFICANT CHANGE UP (ref 5–17)
APTT BLD: 30 SEC — SIGNIFICANT CHANGE UP (ref 27.5–35.5)
BLD GP AB SCN SERPL QL: NEGATIVE — SIGNIFICANT CHANGE UP
BUN SERPL-MCNC: 14 MG/DL — SIGNIFICANT CHANGE UP (ref 7–23)
CALCIUM SERPL-MCNC: 9.1 MG/DL — SIGNIFICANT CHANGE UP (ref 8.4–10.5)
CHLORIDE SERPL-SCNC: 104 MMOL/L — SIGNIFICANT CHANGE UP (ref 96–108)
CO2 SERPL-SCNC: 20 MMOL/L — LOW (ref 22–31)
CREAT SERPL-MCNC: 0.82 MG/DL — SIGNIFICANT CHANGE UP (ref 0.5–1.3)
GLUCOSE SERPL-MCNC: 113 MG/DL — HIGH (ref 70–99)
HCT VFR BLD CALC: 46 % — SIGNIFICANT CHANGE UP (ref 39–50)
HCV AB S/CO SERPL IA: 0.04 S/CO — SIGNIFICANT CHANGE UP
HCV AB SERPL-IMP: SIGNIFICANT CHANGE UP
HGB BLD-MCNC: 15.6 G/DL — SIGNIFICANT CHANGE UP (ref 13–17)
INR BLD: 1.12 — SIGNIFICANT CHANGE UP (ref 0.88–1.16)
MAGNESIUM SERPL-MCNC: 2.2 MG/DL — SIGNIFICANT CHANGE UP (ref 1.6–2.6)
MCHC RBC-ENTMCNC: 28.5 PG — SIGNIFICANT CHANGE UP (ref 27–34)
MCHC RBC-ENTMCNC: 33.9 GM/DL — SIGNIFICANT CHANGE UP (ref 32–36)
MCV RBC AUTO: 84.1 FL — SIGNIFICANT CHANGE UP (ref 80–100)
NRBC # BLD: 0 /100 WBCS — SIGNIFICANT CHANGE UP (ref 0–0)
PHOSPHATE SERPL-MCNC: 3 MG/DL — SIGNIFICANT CHANGE UP (ref 2.5–4.5)
PLATELET # BLD AUTO: 244 K/UL — SIGNIFICANT CHANGE UP (ref 150–400)
POTASSIUM SERPL-MCNC: 3.2 MMOL/L — LOW (ref 3.5–5.3)
POTASSIUM SERPL-SCNC: 3.2 MMOL/L — LOW (ref 3.5–5.3)
PROTHROM AB SERPL-ACNC: 13.4 SEC — SIGNIFICANT CHANGE UP (ref 10.6–13.6)
RBC # BLD: 5.47 M/UL — SIGNIFICANT CHANGE UP (ref 4.2–5.8)
RBC # FLD: 14.2 % — SIGNIFICANT CHANGE UP (ref 10.3–14.5)
RH IG SCN BLD-IMP: POSITIVE — SIGNIFICANT CHANGE UP
SODIUM SERPL-SCNC: 139 MMOL/L — SIGNIFICANT CHANGE UP (ref 135–145)
WBC # BLD: 8.54 K/UL — SIGNIFICANT CHANGE UP (ref 3.8–10.5)
WBC # FLD AUTO: 8.54 K/UL — SIGNIFICANT CHANGE UP (ref 3.8–10.5)

## 2021-12-07 PROCEDURE — 93306 TTE W/DOPPLER COMPLETE: CPT | Mod: 26

## 2021-12-07 PROCEDURE — 45330 DIAGNOSTIC SIGMOIDOSCOPY: CPT

## 2021-12-07 PROCEDURE — 99222 1ST HOSP IP/OBS MODERATE 55: CPT | Mod: 25

## 2021-12-07 RX ORDER — POTASSIUM CHLORIDE 20 MEQ
10 PACKET (EA) ORAL
Refills: 0 | Status: COMPLETED | OUTPATIENT
Start: 2021-12-07 | End: 2021-12-07

## 2021-12-07 RX ORDER — POTASSIUM CHLORIDE 20 MEQ
40 PACKET (EA) ORAL ONCE
Refills: 0 | Status: COMPLETED | OUTPATIENT
Start: 2021-12-07 | End: 2021-12-07

## 2021-12-07 RX ORDER — METOPROLOL TARTRATE 50 MG
25 TABLET ORAL EVERY 12 HOURS
Refills: 0 | Status: DISCONTINUED | OUTPATIENT
Start: 2021-12-07 | End: 2021-12-08

## 2021-12-07 RX ORDER — SODIUM CHLORIDE 9 MG/ML
1000 INJECTION, SOLUTION INTRAVENOUS
Refills: 0 | Status: DISCONTINUED | OUTPATIENT
Start: 2021-12-07 | End: 2021-12-10

## 2021-12-07 RX ORDER — POTASSIUM CHLORIDE 20 MEQ
40 PACKET (EA) ORAL EVERY 4 HOURS
Refills: 0 | Status: DISCONTINUED | OUTPATIENT
Start: 2021-12-07 | End: 2021-12-07

## 2021-12-07 RX ORDER — POLYETHYLENE GLYCOL 3350 17 G/17G
17 POWDER, FOR SOLUTION ORAL
Refills: 0 | Status: COMPLETED | OUTPATIENT
Start: 2021-12-07 | End: 2021-12-07

## 2021-12-07 RX ADMIN — HEPARIN SODIUM 5000 UNIT(S): 5000 INJECTION INTRAVENOUS; SUBCUTANEOUS at 22:18

## 2021-12-07 RX ADMIN — Medication 2.5 MILLIGRAM(S): at 00:07

## 2021-12-07 RX ADMIN — Medication 40 MILLIEQUIVALENT(S): at 12:48

## 2021-12-07 RX ADMIN — Medication 100 MILLIEQUIVALENT(S): at 12:48

## 2021-12-07 RX ADMIN — Medication 2.5 MILLIGRAM(S): at 05:13

## 2021-12-07 RX ADMIN — POLYETHYLENE GLYCOL 3350 17 GRAM(S): 17 POWDER, FOR SOLUTION ORAL at 17:57

## 2021-12-07 RX ADMIN — Medication 40 MILLIEQUIVALENT(S): at 16:46

## 2021-12-07 RX ADMIN — HEPARIN SODIUM 5000 UNIT(S): 5000 INJECTION INTRAVENOUS; SUBCUTANEOUS at 15:58

## 2021-12-07 RX ADMIN — SODIUM CHLORIDE 60 MILLILITER(S): 9 INJECTION, SOLUTION INTRAVENOUS at 16:46

## 2021-12-07 RX ADMIN — Medication 3 MILLILITER(S): at 05:12

## 2021-12-07 RX ADMIN — Medication 3 MILLILITER(S): at 00:04

## 2021-12-07 RX ADMIN — Medication 1 ENEMA: at 00:08

## 2021-12-07 RX ADMIN — Medication 3 MILLILITER(S): at 17:57

## 2021-12-07 RX ADMIN — Medication 3 MILLILITER(S): at 23:13

## 2021-12-07 RX ADMIN — POLYETHYLENE GLYCOL 3350 17 GRAM(S): 17 POWDER, FOR SOLUTION ORAL at 18:56

## 2021-12-07 RX ADMIN — POLYETHYLENE GLYCOL 3350 17 GRAM(S): 17 POWDER, FOR SOLUTION ORAL at 19:49

## 2021-12-07 RX ADMIN — Medication 100 MILLIEQUIVALENT(S): at 00:01

## 2021-12-07 RX ADMIN — Medication 2.5 MILLIGRAM(S): at 12:48

## 2021-12-07 RX ADMIN — Medication 3 MILLILITER(S): at 12:47

## 2021-12-07 RX ADMIN — TAMSULOSIN HYDROCHLORIDE 0.4 MILLIGRAM(S): 0.4 CAPSULE ORAL at 22:19

## 2021-12-07 RX ADMIN — Medication 25 MILLIGRAM(S): at 17:57

## 2021-12-07 RX ADMIN — HEPARIN SODIUM 5000 UNIT(S): 5000 INJECTION INTRAVENOUS; SUBCUTANEOUS at 05:13

## 2021-12-07 RX ADMIN — Medication 1000 MILLIGRAM(S): at 02:05

## 2021-12-07 NOTE — CONSULT NOTE ADULT - SUBJECTIVE AND OBJECTIVE BOX
HPI:  75 yo M PMH of asthma, HTN presented for abdominal distension and obstipation.   not being able to pass a decent bowel movement or gas in 3-4 days. He also endorsed abdominal distension, discomfort and diffuse pain. He denied nausea or vomiting but been hiccuping. He denied any personal or family hx of colon cancer or any cancer for that matter. His last Cscope was 2 years ago and reportedly was normal. Off note, he denied any prior abdominal surgeries.     In the ED, he was tachycardiac to 115 and systolic 180 mmHg as he skipped his home BP medications. His labs are unremarkable. CT showed distal sigmoid obstruction/apple core lesion. Maximum cecal diameter is 11cm.  (06 Dec 2021 20:53)    Allergies    No Known Allergies    Intolerances      Home Medications:  albuterol:  (06 Dec 2021 21:00)  Incruse Ellipta 62.5 mcg/inh inhalation powder:  (06 Dec 2021 21:00)  tamsulosin 0.4 mg oral capsule: 1 cap(s) orally once a day (06 Dec 2021 21:00)    MEDICATIONS:  MEDICATIONS  (STANDING):  ALBUTerol    90 MICROgram(s) HFA Inhaler 1 Puff(s) Inhalation daily  albuterol/ipratropium for Nebulization 3 milliLiter(s) Nebulizer every 6 hours  heparin   Injectable 5000 Unit(s) SubCutaneous every 8 hours  lactated ringers. 1000 milliLiter(s) (140 mL/Hr) IV Continuous <Continuous>  metoprolol tartrate Injectable 2.5 milliGRAM(s) IV Push every 6 hours  saline laxative (FLEET) Rectal Enema 1 Enema Rectal at bedtime  tamsulosin 0.4 milliGRAM(s) Oral at bedtime    MEDICATIONS  (PRN):  HYDROmorphone  Injectable 1 milliGRAM(s) IV Push every 4 hours PRN Severe Pain (7 - 10)  ondansetron Injectable 4 milliGRAM(s) IV Push every 6 hours PRN Nausea    PAST MEDICAL & SURGICAL HISTORY:  Asthma    Hypertension    History of BPH      FAMILY HISTORY:    SOCIAL HISTORY:  Tobacco: [ ] Current, [ ] Former, [ ] Never; Pack Years:  Alcohol:  Illicit Drugs:    REVIEW OF SYSTEMS:  All other 10 review of systems is negative except as indicated in HPI    Vital Signs Last 24 Hrs  T(C): 36.7 (07 Dec 2021 05:42), Max: 37.7 (06 Dec 2021 18:05)  T(F): 98 (07 Dec 2021 05:42), Max: 99.8 (06 Dec 2021 18:05)  HR: 98 (07 Dec 2021 07:18) (88 - 114)  BP: 125/75 (07 Dec 2021 07:18) (125/72 - 181/102)  BP(mean): 93 (07 Dec 2021 07:18) (93 - 114)  RR: 18 (07 Dec 2021 07:18) (16 - 18)  SpO2: 93% (07 Dec 2021 07:18) (92% - 98%)    12-06 @ 07:01  -  12-07 @ 07:00  --------------------------------------------------------  IN: 1320 mL / OUT: 450 mL / NET: 870 mL        PHYSICAL EXAM:    General: Well developed; well nourished; in no acute distress  Eyes: moist conjunctivae, sclerae anicteric  HENT: Moist mucous membranes, tongue midline  Neck: Trachea midline, supple  Lungs: Normal respiratory effort and no intercostal retractions  Cardiovascular: RRR, S1S2  Abdomen: distended, No rebound or guarding  Extremities: Normal range of motion, No clubbing, cyanosis or edema  Neurological: Alert and oriented x3, nonfocal exam  Skin: Warm and dry. No obvious rash    LABS:                        15.6   8.54  )-----------( 244      ( 07 Dec 2021 07:46 )             46.0     12-07    139  |  104  |  14  ----------------------------<  x   x    |  x   |  0.82    Ca    9.1      07 Dec 2021 07:46  Phos  3.0     12-07  Mg     2.2     12-07    TPro  7.3  /  Alb  4.4  /  TBili  0.7  /  DBili  x   /  AST  14  /  ALT  16  /  AlkPhos  57  12-06        PT/INR - ( 07 Dec 2021 07:46 )   PT: 13.4 sec;   INR: 1.12          PTT - ( 07 Dec 2021 07:46 )  PTT:30.0 sec    Urinalysis with Rflx Culture (collected 06 Dec 2021 17:33)      RADIOLOGY & ADDITIONAL STUDIES:   Large bowel obstruction, secondary to an annular/apple core lesion within the distal sigmoid colon which is suspicious for neoplasm-colonic adenocarcinoma.   HPI:  75 yo M PMH of asthma, HTN presented for abdominal distension and obstipation.  He reports sudden onset around 1 week ago.  Sx worsen with distention and discomfort.  He denies new nausea.  GIven persistent symptoms.  not being able to pass a decent bowel movement or gas in 3-4 days. He also endorsed abdominal distension, discomfort and diffuse pain. He denied nausea or vomiting but been hiccuping. He denied any personal or family hx of colon cancer or any cancer for that matter. His last Cscope was 2 years ago and reportedly was normal. Off note, he denied any prior abdominal surgeries.       Allergies    No Known Allergies    Intolerances      Home Medications:  albuterol:  (06 Dec 2021 21:00)  Incruse Ellipta 62.5 mcg/inh inhalation powder:  (06 Dec 2021 21:00)  tamsulosin 0.4 mg oral capsule: 1 cap(s) orally once a day (06 Dec 2021 21:00)    MEDICATIONS:  MEDICATIONS  (STANDING):  ALBUTerol    90 MICROgram(s) HFA Inhaler 1 Puff(s) Inhalation daily  albuterol/ipratropium for Nebulization 3 milliLiter(s) Nebulizer every 6 hours  heparin   Injectable 5000 Unit(s) SubCutaneous every 8 hours  lactated ringers. 1000 milliLiter(s) (140 mL/Hr) IV Continuous <Continuous>  metoprolol tartrate Injectable 2.5 milliGRAM(s) IV Push every 6 hours  saline laxative (FLEET) Rectal Enema 1 Enema Rectal at bedtime  tamsulosin 0.4 milliGRAM(s) Oral at bedtime    MEDICATIONS  (PRN):  HYDROmorphone  Injectable 1 milliGRAM(s) IV Push every 4 hours PRN Severe Pain (7 - 10)  ondansetron Injectable 4 milliGRAM(s) IV Push every 6 hours PRN Nausea    PAST MEDICAL & SURGICAL HISTORY:  Asthma    Hypertension    History of BPH      FAMILY HISTORY:    SOCIAL HISTORY:  Tobacco: [ ] Current, [ ] Former, [ ] Never; Pack Years:  Alcohol:  Illicit Drugs:    REVIEW OF SYSTEMS:  All other 10 review of systems is negative except as indicated in HPI    Vital Signs Last 24 Hrs  T(C): 36.7 (07 Dec 2021 05:42), Max: 37.7 (06 Dec 2021 18:05)  T(F): 98 (07 Dec 2021 05:42), Max: 99.8 (06 Dec 2021 18:05)  HR: 98 (07 Dec 2021 07:18) (88 - 114)  BP: 125/75 (07 Dec 2021 07:18) (125/72 - 181/102)  BP(mean): 93 (07 Dec 2021 07:18) (93 - 114)  RR: 18 (07 Dec 2021 07:18) (16 - 18)  SpO2: 93% (07 Dec 2021 07:18) (92% - 98%)    12-06 @ 07:01  -  12-07 @ 07:00  --------------------------------------------------------  IN: 1320 mL / OUT: 450 mL / NET: 870 mL        PHYSICAL EXAM:    General: Well developed; well nourished; in no acute distress  Eyes: moist conjunctivae, sclerae anicteric  HENT: Moist mucous membranes, tongue midline  Neck: Trachea midline, supple  Lungs: Normal respiratory effort and no intercostal retractions  Cardiovascular: RRR, S1S2  Abdomen: distended, No rebound or guarding  Extremities: Normal range of motion, No clubbing, cyanosis or edema  Neurological: Alert and oriented x3, nonfocal exam  Skin: Warm and dry. No obvious rash    LABS:                        15.6   8.54  )-----------( 244      ( 07 Dec 2021 07:46 )             46.0     12-07    139  |  104  |  14  ----------------------------<  x   x    |  x   |  0.82    Ca    9.1      07 Dec 2021 07:46  Phos  3.0     12-07  Mg     2.2     12-07    TPro  7.3  /  Alb  4.4  /  TBili  0.7  /  DBili  x   /  AST  14  /  ALT  16  /  AlkPhos  57  12-06        PT/INR - ( 07 Dec 2021 07:46 )   PT: 13.4 sec;   INR: 1.12          PTT - ( 07 Dec 2021 07:46 )  PTT:30.0 sec    Urinalysis with Rflx Culture (collected 06 Dec 2021 17:33)      RADIOLOGY & ADDITIONAL STUDIES:   Large bowel obstruction, secondary to an annular/apple core lesion within the distal sigmoid colon which is suspicious for neoplasm-colonic adenocarcinoma.   HPI:  75 yo M PMH of asthma, HTN presented for abdominal distension and constipation.  He reports sudden onset around 1 week ago.  Sx worsen with distention and discomfort.  He denies new nausea but uncertain about flatus.  Given persistent symptoms he presented to the ED.  Hx of laxative use.  Denies fam hx of CRC.  Last colonoscopy 2 years ago, reportedly normal without polyp.  No prior abdominal surgery.       Allergies    No Known Allergies    Intolerances      Home Medications:  albuterol:  (06 Dec 2021 21:00)  Incruse Ellipta 62.5 mcg/inh inhalation powder:  (06 Dec 2021 21:00)  tamsulosin 0.4 mg oral capsule: 1 cap(s) orally once a day (06 Dec 2021 21:00)    MEDICATIONS:  MEDICATIONS  (STANDING):  ALBUTerol    90 MICROgram(s) HFA Inhaler 1 Puff(s) Inhalation daily  albuterol/ipratropium for Nebulization 3 milliLiter(s) Nebulizer every 6 hours  heparin   Injectable 5000 Unit(s) SubCutaneous every 8 hours  lactated ringers. 1000 milliLiter(s) (140 mL/Hr) IV Continuous <Continuous>  metoprolol tartrate Injectable 2.5 milliGRAM(s) IV Push every 6 hours  saline laxative (FLEET) Rectal Enema 1 Enema Rectal at bedtime  tamsulosin 0.4 milliGRAM(s) Oral at bedtime    MEDICATIONS  (PRN):  HYDROmorphone  Injectable 1 milliGRAM(s) IV Push every 4 hours PRN Severe Pain (7 - 10)  ondansetron Injectable 4 milliGRAM(s) IV Push every 6 hours PRN Nausea    PAST MEDICAL & SURGICAL HISTORY:  Asthma    Hypertension    History of BPH      FAMILY HISTORY:  Denies fam hx ofCRC    SOCIAL HISTORY:  Tobacco: Denies  Alcohol: Denies  Illicit Drugs: Denies    REVIEW OF SYSTEMS:  All other 10 review of systems is negative except as indicated in HPI    Vital Signs Last 24 Hrs  T(C): 36.7 (07 Dec 2021 05:42), Max: 37.7 (06 Dec 2021 18:05)  T(F): 98 (07 Dec 2021 05:42), Max: 99.8 (06 Dec 2021 18:05)  HR: 98 (07 Dec 2021 07:18) (88 - 114)  BP: 125/75 (07 Dec 2021 07:18) (125/72 - 181/102)  BP(mean): 93 (07 Dec 2021 07:18) (93 - 114)  RR: 18 (07 Dec 2021 07:18) (16 - 18)  SpO2: 93% (07 Dec 2021 07:18) (92% - 98%)    12-06 @ 07:01  -  12-07 @ 07:00  --------------------------------------------------------  IN: 1320 mL / OUT: 450 mL / NET: 870 mL        PHYSICAL EXAM:    General: Appears comfortable in bed, in no acute distress  Eyes: moist conjunctivae, sclerae anicteric  HENT: Moist mucous membranes, tongue midline  Neck: Trachea midline, supple  Lungs: Normal respiratory effort and no intercostal retractions  Cardiovascular: RRR, S1S2  Abdomen: distended, No rebound or guarding  Extremities: Normal range of motion, No clubbing, cyanosis or edema  Neurological: Alert and oriented x3, nonfocal exam  Skin: Warm and dry. No obvious rash    LABS:                        15.6   8.54  )-----------( 244      ( 07 Dec 2021 07:46 )             46.0     12-07    139  |  104  |  14  ----------------------------<  x   x    |  x   |  0.82    Ca    9.1      07 Dec 2021 07:46  Phos  3.0     12-07  Mg     2.2     12-07    TPro  7.3  /  Alb  4.4  /  TBili  0.7  /  DBili  x   /  AST  14  /  ALT  16  /  AlkPhos  57  12-06        PT/INR - ( 07 Dec 2021 07:46 )   PT: 13.4 sec;   INR: 1.12          PTT - ( 07 Dec 2021 07:46 )  PTT:30.0 sec    Urinalysis with Rflx Culture (collected 06 Dec 2021 17:33)      RADIOLOGY & ADDITIONAL STUDIES:   Large bowel obstruction, secondary to an annular/apple core lesion within the distal sigmoid colon which is suspicious for neoplasm-colonic adenocarcinoma.

## 2021-12-07 NOTE — PROGRESS NOTE ADULT - SUBJECTIVE AND OBJECTIVE BOX
Interventional, Pulmonary, Critical, Chest Special Procedures.    Pt was seen and fully examined by myself.     Time spent with patient in minutes: 137    Patient is a 76y old  Male who presents with a chief complaint of distal sigmoid obstruction (07 Dec 2021 08:47) Asked to manage this high risk pt planned for abdominal surgery. Events leading to this admission reviewed. The patient underwent decompression Flex/Sig with now improved abdominal pain. The patient denies any change in his asthma severity, denies any recent systemic steroids, denies vomiting or aspiration, denies any recent URI, no chest trrauma, no H/O GAURANG  HPI:  75 yo M PMH of asthma, HTN presented for abdominal distension, not being able to pass a decent bowel movement or gas in 3-4 days. He also endorsed abdominal distension, discomfort and diffuse pain. He denied nausea or vomiting but been hiccuping. He denied any personal or family hx of colon cancer or any cancer for that matter. His last Cscope was 2 years ago and reportedly was normal. Off note, he denied any prior abdominal surgeries.     In the ED, he was tachycardiac to 115 and systolic 180 mmHg as he skipped his home BP medications. His labs are unremarkable. CT showed distal sigmoid obstruction/apple core lesion. Maximum cecal diameter is 11cm.  (06 Dec 2021 20:53)      REVIEW OF SYSTEMS:  Constitutional: No fever, weight loss, chills +++fatigue  Eyes: No eye pain, visual disturbances, or discharge  ENMT:  No difficulty hearing, tinnitus, vertigo; No sinus or throat pain. No epistaxis, dysphagia, dysphonia, hoarseness or odynophagia  Neck: No pain, stiffness or neck swelling.  No masses or deformities  Respiratory: chronic cough, no wheezing, hemoptysis  - COPD  - ILD   - PE   + ASTHMA     - PNEUMONIA  Cardiovascular: No chest pain, dysrhythmia, palpitations, dizziness or edema - CAD   - CHF   + HTN  Gastrointestinal: + abdominal or epigastric pain. + nausea, no vomiting or hematemesis; No diarrhea or constipation. No melena or hematochezia, Icterus.          Genitourinary: No dysuria, frequency, hematuria or incontinence   - CKD/ROSS      - ESRD  Neurological: No headaches, memory loss, loss of strength, numbness or tremors      -DEMENTIA     - STROKE    - SEIZURE  Skin: No itching, burning, rashes or lesions   Lymph Nodes: No enlarged glands  Endocrine: No heat or cold intolerance; No hair loss       - DM     - THYROID DISORDER  Musculoskeletal: No joint pain or swelling; No muscle, back or extremity pain, No edema  Psychiatric: No depression, anxiety, mood swings or difficulty sleeping  Heme/Lymph: No easy bruising or bleeding gums         - ANEMIA      - CANCER   -COAGULOPATHY  Allergy and Immunologic: No hives or eczema    PAST MEDICAL & SURGICAL HISTORY:  Asthma    Hypertension    History of BPH      FAMILY HISTORY:    SOCIAL HISTORY:      - Tobacco     - ETOH    Allergies    No Known Allergies    Intolerances      Vital Signs Last 24 Hrs  T(C): 36.5 (07 Dec 2021 14:01), Max: 37.7 (06 Dec 2021 18:05)  T(F): 97.7 (07 Dec 2021 14:01), Max: 99.8 (06 Dec 2021 18:05)  HR: 95 (07 Dec 2021 12:45) (88 - 100)  BP: 131/85 (07 Dec 2021 12:45) (125/72 - 151/81)  BP(mean): 103 (07 Dec 2021 12:45) (93 - 114)  RR: 18 (07 Dec 2021 12:45) (16 - 18)  SpO2: 95% (07 Dec 2021 12:45) (92% - 98%)    12-06 @ 07:01  -  12-07 @ 07:00  --------------------------------------------------------  IN: 1320 mL / OUT: 450 mL / NET: 870 mL    12-07 @ 07:01  -  12-07 @ 14:28  --------------------------------------------------------  IN: 420 mL / OUT: 225 mL / NET: 195 mL          MEDICATIONS:  MEDICATIONS  (STANDING):  ALBUTerol    90 MICROgram(s) HFA Inhaler 1 Puff(s) Inhalation daily  albuterol/ipratropium for Nebulization 3 milliLiter(s) Nebulizer every 6 hours  heparin   Injectable 5000 Unit(s) SubCutaneous every 8 hours  lactated ringers. 1000 milliLiter(s) (140 mL/Hr) IV Continuous <Continuous>  metoprolol tartrate Injectable 2.5 milliGRAM(s) IV Push every 6 hours  potassium chloride   Powder 40 milliEquivalent(s) Oral every 4 hours  potassium chloride  10 mEq/100 mL IVPB 10 milliEquivalent(s) IV Intermittent every 1 hour  saline laxative (FLEET) Rectal Enema 1 Enema Rectal at bedtime  tamsulosin 0.4 milliGRAM(s) Oral at bedtime    MEDICATIONS  (PRN):  HYDROmorphone  Injectable 1 milliGRAM(s) IV Push every 4 hours PRN Severe Pain (7 - 10)  ondansetron Injectable 4 milliGRAM(s) IV Push every 6 hours PRN Nausea      PHYSICAL EXAM:  Un Comfortable, no immediate distress  Eyes: PERRL, EOM intact; conjunctiva and sclera clear  Head: Normocephalic;  No Trauma  ENMT: No nasal discharge, hoarseness, cough or hemoptysis  Neck: Supple; non tender; no masses or deformities.    No JVD  Respiratory: CTA,  - WHEEZING   - RHONCHI  - RALES  - CRACKLES.  Diminished breath sounds  BILATERAL  RIGHT  LEFT bases c inspiratory splinting   Cardiovascular: Regular rate and rhythm. S1 and S2 Normal; No murmurs, gallops or rubs     - PPM/AICD  Gastrointestinal: Soft obese, mildly tender, non-distended; Normal bowel sounds; No hepatosplenomegaly.     -PEG    -  GT   - HYDE  Genitourinary: No costovertebral angle tenderness. No dysuria  Extremities: AROM, No clubbing, cyanosis or edema    Vascular: Peripheral pulses palpable 2+ bilaterally  Neurological: Alert and responisve to stimuli   Skin: Warm and dry. No obvious rash  Lymph Nodes: No acute cervical or supraclavicular adenopathy  Psychiatric: Cooperative and appropriate mood    DEVICES:  - DENTURES   +IV R / L     - ETUBE   -TRACH   -CTUBE  R / L    LABS:                          15.6   8.54  )-----------( 244      ( 07 Dec 2021 07:46 )             46.0     12-07    139  |  104  |  14  ----------------------------<  113<H>  3.2<L>   |  20<L>  |  0.82    Ca    9.1      07 Dec 2021 07:46  Phos  3.0     12-07  Mg     2.2     12-07    TPro  7.3  /  Alb  4.4  /  TBili  0.7  /  DBili  x   /  AST  14  /  ALT  16  /  AlkPhos  57  12-06    PT/INR - ( 07 Dec 2021 07:46 )   PT: 13.4 sec;   INR: 1.12          PTT - ( 07 Dec 2021 07:46 )  PTT:30.0 sec  RADIOLOGY & ADDITIONAL STUDIES (The following images were personally reviewed):< from: CT Abdomen and Pelvis w/ Oral Cont and w/ IV Cont (12.06.21 @ 16:45) >  EXAM:  CT ABDOMEN AND PELVIS OC IC                          PROCEDURE DATE:  12/06/2021          INTERPRETATION:  CT SCAN OF ABDOMEN AND PELVIS    History: Nausea, vomiting, abdominal pain. No bowel movement for one week.    TECHNIQUE: CT of the abdomen and pelvis was performed from lung bases through symphysis pubis. Intravenous and oral contrast material were utilized. Axial, sagittal and coronal images were produced and reviewed. 100 cc of Isovue-370 were administered.    Comparison: None.    Findings:    Lower chest: Small linear atelectasis versus scarring in the bilateral lung bases. Mild cylindrical bronchiectasis. Coronary artery calcifications. Mild wall thickening of distal esophagus.    Liver:  Hepatic steatosis.    Gallbladder: No radiopaque stones gallbladder.    Spleen:  Mild splenomegaly, 13.5 cm.    Pancreas:  Fatty atrophy of the pancreas. Punctate calcification in the pancreatic head. 1.2 x 1.0 cm cystic lesion posterior to the pancreatic body (series 3 image 48). Could represent sidebranch IPMN. Follow-up with CT or MR in one year. No ductal dilatation.    Adrenal glands:  Normal.    Kidneys: Right renal cysts, with dominant lesion measuring 5.0 cm right lower pole. The latter has subtle wall calcification. Few too small to characterize left renal cortical hypodensities. No hydronephrosis.    Adenopathy:  No lymphadenopathy in abdomen or pelvis.    Ascites: None.    Gastrointestinal tract: Normal small bowel. Normal appendix. The colon is diffusely dilated andfluid-filled, with the cecum measuring up to 11.6 cm and the transverse colon measuring 7.4 cm in caliber. A transition point is noted at the distal sigmoid colon where there is increased soft tissue density measuring 6.4 cm in length (series 3 [id number]), suspicious for mass. Mild sigmoid diverticulosis is noted with trace pericolonic stranding. Trace fat stranding is also noted along the ascending colon. No additional sites of significant colonic wall thickening are noted. No mesenteric edema or pneumoperitoneum.    Vessels: Normal caliber aorta. Mild to moderate atherosclerotic plaque.    Pelvic organs: Mildly enlarged prostate. Unremarkable urinary bladder.    Soft tissues: Normal.    Bones: Degenerative changes of the spine with grade 1 anterolisthesis L4 on L5.    Impression:  1.  Large bowel obstruction, secondary to an annular/apple core lesion within the distal sigmoid colon which is suspicious for neoplasm-colonic adenocarcinoma. Correlation with colonoscopy is recommended. Cecal diameter 11.6 cm. Competent ileocecal valve.    < end of copied text >

## 2021-12-07 NOTE — CONSULT NOTE ADULT - REASON FOR ADMISSION
Addy Edmond is a 48 y.o. male and presents with Hypertension (follow up); Diabetes (follow up); and Cough (x 5 days )    Subjective:  Hypertension Review:  The patient has hypertension  Diet and Lifestyle: generally does try to follow a  low sodium diet, but drinks 4 cups of coffee daily. exercises some. Home BP Monitoring: is measured at home. Pertinent ROS: NOT taking medications as instructed, no medication side effects noted. No TIA's, chest pain on exertion, dyspnea on exertion, or swelling of ankles. BP Readings from Last 3 Encounters:   10/24/17 (!) 165/99   03/22/17 (!) 160/101   12/22/16 (!) 151/96     Diabetes Mellitus Review:  He has diabetes mellitus, non-insulin  Diabetic ROS -negative for polyuria,polydipsia,polyphagia, and heat intolerance  Current diabetic medications include oral agents, NO insulin    Medication compliance: compliant MOST of the time  Diabetic diet compliance: compliant MOST of the time,   Yearly visit with dietician: no  Current exercise: walking, at work  Home glucose monitoring: is performed daily, averaging 140-180's  Any episodes of hypoglycemia? no  Known diabetic complications: none  Cardiovascular risk factors: family history, dyslipidemia, obesity, hypertension  Yearly foot exam:   Eye exam current (within one year): not yet  Weight trend: stable   Is He on ACE inhibitor or angiotensin II receptor blocker? Yes   Lab review: orders written for new lab studies as appropriate; see orders. No results found for: HBA1C, HGBE8, NIZ3SGAY, [id number]    Dyslipidemia Review:  Patient presents for evaluation of lipids. Compliance with treatment thus far has been fair. Denies myalgias, slurring speech, unilateral weakness, and chest pain. A repeat non-fasting lipid profile was done. The patient does NOT use medications that may worsen dyslipidemias (corticosteroids, progestins, anabolic steroids, diuretics, beta-blockers, amiodarone, cyclosporine, olanzapine).  The patient
distal sigmoid obstruction
does exercise regularly at work. The patient is NOT known to have coexisting coronary artery disease. NOT taking Aspirin daily as prescribed/advised, pt preference. Asthma Review:  The patient is being seen for follow up of asthma,  currently progressively worsening  Asthma symptoms occur: infrequently. Wheezing when present is described as mild and easily NOT relieved with rescue bronchodilator, sensation of throat discomfort with Albuterol use reported. The patient reports NO use of a steroid inhaler. Frequency of use of quick-relief meds: rarely. Regimen compliance: The patient reports adherence to this regimen. Smoker, reluctant to quit. Review of Systems  Constitutional: negative for fevers, chills, anorexia and weight loss  Eyes:   negative for visual disturbance, drainage, and irritation  ENT:   negative for tinnitus,sore throat,nasal congestion,ear pain,and hoarseness  Respiratory:  negative for hemoptysis, dyspnea, and wheezing  CV:   negative for chest pain, palpitations, and lower extremity edema  GI:   negative for nausea, vomiting, diarrhea, abdominal pain, and melena  Endo:               negative for polyuria,polydipsia,polyphagia, and heat intolerance  Genitourinary: negative for frequency, urgency, dysuria, retention, and hematuria  Integument:  negative for rash, ulcerations, and pruritus  Hematologic:  negative for easy bruising and bleeding  Musculoskel: negative for arthralgias, muscle weakness,and joint pain/swelling  Neurological:  negative for headaches, dizziness, vertigo,and memory/gait problems  Behavl/Psych: negative for feelings of anxiety, depression, suicide, and mood changes    Past Medical History:   Diagnosis Date    Diabetes ([facility] [address]     Hypertension      History reviewed. No pertinent surgical history.   Social History     Social History    Marital status:      Spouse name: N/A    Number of children: N/A    Years of education: N/A     Social History Main Topics
 Smoking status: Current Every Day Smoker     Packs/day: 1.00    Smokeless tobacco: Never Used    Alcohol use No    Drug use: No    Sexual activity: Yes     Partners: Female     Other Topics Concern    None     Social History Narrative     History reviewed. No pertinent family history. Current Outpatient Prescriptions   Medication Sig Dispense Refill    albuterol (PROVENTIL HFA, VENTOLIN HFA, PROAIR HFA) 90 mcg/actuation inhaler Take 2 Puffs by inhalation every four (4) hours as needed for Wheezing or Shortness of Breath. 1 Inhaler 0    predniSONE (DELTASONE) 20 mg tablet Take 2 Tabs by mouth daily (with breakfast). 10 Tab 0    losartan-hydroCHLOROthiazide (HYZAAR) 50-12.5 mg per tablet Take 1 Tab by mouth daily. To replace LOSARTAN 100mg 30 Tab 11    atorvastatin (LIPITOR) 20 mg tablet Take 1 Tab by mouth nightly. Indications: mixed hyperlipidemia 30 Tab 11    cetirizine (ZYRTEC) 10 mg tablet Take 1 Tab by mouth daily. Indications: ALLERGIC RHINITIS 30 Tab 5    Cholecalciferol, Vitamin D3, (VITAMIN D3) 2,000 unit cap capsule Take 2,000 Units by mouth two (2) times a day. Indications: VITAMIN D DEFICIENCY (HIGH DOSE THERAPY) 60 Cap 11     No Known Allergies    Objective:  Visit Vitals    BP (!) 165/99 (BP 1 Location: Right arm, BP Patient Position: Sitting)    Pulse 81    Temp 99.9 °F (37.7 °C) (Oral)    Resp 20    Ht 5' 2\" (1.575 m)    Wt 160 lb 9.6 oz (72.8 kg)    SpO2 95%    BMI 29.37 kg/m2     Wt Readings from Last 3 Encounters:   10/24/17 160 lb 9.6 oz (72.8 kg)   03/22/17 166 lb 8 oz (75.5 kg)   12/22/16 164 lb 8 oz (74.6 kg)     Physical Exam:   General appearance - alert, well appearing, and in mild distress. Mental status - A/O x 4, normal mood and affect. +eye squinting. Neck -Supple ,normal CSP. FROM, non-tender. No significant adenopathy/thyromegaly. No JVD. Chest - Rhonchi in right base, clear in all other lung fields. . Symmetric chest rise. No wheezing or rales.   Heart -
Normal rate, regular rhythm. Normal S1, S2. No MGR or clicks. Abdomen - Soft,non-distended. Normoactive BS in all quadrants. NT, no mass or HSM. Ext- Radial, DP pulses, 2+ bilaterally. No pedal edema, clubbing, or cyanosis. No ulcerations to right 5th digit. Skin-Warm and dry. No hyperpigmentation, ulcerations, or suspicious lesions. Neuro - Normal speech, no focal findings or movement disorder. Normal strength, gait, and muscle tone. Results for orders placed or performed in visit on 10/24/17   AMB POC GLUCOSE BLOOD, BY GLUCOSE MONITORING DEVICE   Result Value Ref Range    Glucose  mg/dL   AMB POC HEMOGLOBIN A1C   Result Value Ref Range    Hemoglobin A1c (POC) 6.4 %   AMB POC LIPID PROFILE   Result Value Ref Range    Cholesterol (POC) 197     Triglycerides (POC) 46     HDL Cholesterol (POC) 61     VLDL (POC) 127 MG/DL    LDL Cholesterol (POC) 136 MG/DL    TChol/HDL Ratio (POC) 3.2      AssessmentPlan:  Resumed Atorvastatin. prednisone 40 mg x 5 days prescribed with albuterol. Changed losartan 100 mg to losartan-HCT 50-12.5  Medication Side Effects and Warnings were discussed with patient: yes   Patient Labs were reviewed: yes  Patient Past Records were reviewed: yes    See below for other orders   Follow-up Disposition:  Return in about 3 months (around 1/24/2018) for HTN, DM f/u. BP check with wife's visit this week. ICD-10-CM ICD-9-CM    1. Type 2 diabetes mellitus without complication, without long-term current use of insulin (HCC) E11.9 250.00 AMB POC GLUCOSE BLOOD, BY GLUCOSE MONITORING DEVICE      AMB POC HEMOGLOBIN A1C   2. Hyperlipidemia, unspecified hyperlipidemia type E78.5 272.4 AMB POC LIPID PROFILE   3. Essential hypertension I10 401.9 AMB POC GLUCOSE BLOOD, BY GLUCOSE MONITORING DEVICE      AMB POC HEMOGLOBIN A1C      AMB POC LIPID PROFILE   4.  Mild intermittent asthma with acute exacerbation J45.21 493.92 albuterol (PROVENTIL HFA, VENTOLIN HFA, PROAIR HFA) 90 mcg/actuation
distal sigmoid obstruction
inhaler   5. Perennial allergic rhinitis J30.89 477.8      Orders Placed This Encounter    AMB POC GLUCOSE BLOOD, BY GLUCOSE MONITORING DEVICE    AMB POC HEMOGLOBIN A1C    AMB POC LIPID PROFILE    albuterol (PROVENTIL HFA, VENTOLIN HFA, PROAIR HFA) 90 mcg/actuation inhaler     Sig: Take 2 Puffs by inhalation every four (4) hours as needed for Wheezing or Shortness of Breath. Dispense:  1 Inhaler     Refill:  0    predniSONE (DELTASONE) 20 mg tablet     Sig: Take 2 Tabs by mouth daily (with breakfast). Dispense:  10 Tab     Refill:  0    losartan-hydroCHLOROthiazide (HYZAAR) 50-12.5 mg per tablet     Sig: Take 1 Tab by mouth daily. To replace LOSARTAN 100mg     Dispense:  30 Tab     Refill:  [address] expressed understanding of plan. An After Visit Summary was offered/printed and given to the patient.

## 2021-12-07 NOTE — CHART NOTE - NSCHARTNOTEFT_GEN_A_CORE
Procedure Note  Flexible Sigmoidoscopy  Attending: Dr. Thornton    Finding:   -A benign appearing sigmoid stricture, therapeutic endoscope was able to advance beyond without resistance. Dilated colon proximal to stricture with liquid stool s/p decompression.    -Left sided diverticulosis.      Plan:    NPO    Management per surgery team Procedure Note  Flexible Sigmoidoscopy  Attending: Dr. Thornton    Finding:   -A benign appearing sigmoid stricture, therapeutic endoscope was able to advance beyond without resistance. Dilated colon proximal to stricture with liquid stool consistent with atony s/p decompression.    -Left sided diverticulosis.      Plan:    NPO    Management per surgery team

## 2021-12-07 NOTE — CONSULT NOTE ADULT - ASSESSMENT
77 yo M PMH of asthma, HTN admitted for large bowel obstruction  Pre op cardiac clearance.  No past cardiac history  No active cardiac symptoms i.e. no chest pain or dyspnea at rest or in the recent outpatient setting. 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 Will try to obtain previous ECG.  Will need to obtain an echo  Will start Metoprolol IV for shelley- and post op beta-blockers with holding parameters.  No cardiac clearance yet for proposed abdominal surgery but patient is cleared for endoscopy on metoprolol.  
75 yo M PMH of asthma, HTN presented for abdominal distension and constipation found to have large bowel obstruction.    #Large Bowel Obstruction  Patient with 1 week of obstipation now s/p flex sig notable for benign appearing sigmoid stricture, nonobstructive.  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  Decompression of the colon was performed.    -NPO  -Surgical management per primary team    Recommendations d/w primary team  [place] d/w [facility] [place] [facility]    Feroz Crowell MD  Gastroenterology Fellow  c: [phone]

## 2021-12-07 NOTE — PROGRESS NOTE ADULT - SUBJECTIVE AND OBJECTIVE BOX
SUBJECTIVE: Pt seen and examined at bedside with chief. Pt denies any complaints. Pain well controlled. Denies fever, chills, n/v. Denies any BF in the past 24 hours.    MEDICATIONS  (STANDING):  ALBUTerol    90 MICROgram(s) HFA Inhaler 1 Puff(s) Inhalation daily  albuterol/ipratropium for Nebulization 3 milliLiter(s) Nebulizer every 6 hours  heparin   Injectable 5000 Unit(s) SubCutaneous every 8 hours  lactated ringers. 1000 milliLiter(s) (140 mL/Hr) IV Continuous <Continuous>  metoprolol tartrate Injectable 2.5 milliGRAM(s) IV Push every 6 hours  saline laxative (FLEET) Rectal Enema 1 Enema Rectal at bedtime  tamsulosin 0.4 milliGRAM(s) Oral at bedtime    MEDICATIONS  (PRN):  HYDROmorphone  Injectable 1 milliGRAM(s) IV Push every 4 hours PRN Severe Pain (7 - 10)  ondansetron Injectable 4 milliGRAM(s) IV Push every 6 hours PRN Nausea      Vital Signs Last 24 Hrs  T(C): 36.7 (07 Dec 2021 05:42), Max: 37.7 (06 Dec 2021 18:05)  T(F): 98 (07 Dec 2021 05:42), Max: 99.8 (06 Dec 2021 18:05)  HR: 98 (07 Dec 2021 07:18) (88 - 114)  BP: 125/75 (07 Dec 2021 07:18) (125/72 - 181/102)  BP(mean): 93 (07 Dec 2021 07:18) (93 - 114)  RR: 18 (07 Dec 2021 07:18) (16 - 18)  SpO2: 93% (07 Dec 2021 07:18) (92% - 98%)    PHYSICAL EXAM:      Constitutional: A&Ox3    Respiratory: non labored breathing, no respiratory distress    Cardiovascular: NSR, RRR    Gastrointestinal: Softly distended. non tender, no rebound or guarding.     Genitourinary: voiding     Extremities: (-) edema                  I&O's Detail    06 Dec 2021 07:01  -  07 Dec 2021 07:00  --------------------------------------------------------  IN:    IV PiggyBack: 200 mL    Lactated Ringers: 1120 mL  Total IN: 1320 mL    OUT:    Voided (mL): 450 mL  Total OUT: 450 mL    Total NET: 870 mL          LABS:                        15.6   8.54  )-----------( 244      ( 07 Dec 2021 07:46 )             46.0     12-    139  |  104  |  14  ----------------------------<  113<H>  3.2<L>   |  20<L>  |  0.82    Ca    9.1      07 Dec 2021 07:46  Phos  3.0       Mg     2.2     12    TPro  7.3  /  Alb  4.4  /  TBili  0.7  /  DBili  x   /  AST  14  /  ALT  16  /  AlkPhos  57  12-06    PT/INR - ( 07 Dec 2021 07:46 )   PT: 13.4 sec;   INR: 1.12          PTT - ( 07 Dec 2021 07:46 )  PTT:30.0 sec  Urinalysis Basic - ( 06 Dec 2021 17:33 )    Color: Yellow / Appearance: Clear / S.010 / pH: x  Gluc: x / Ketone: Trace mg/dL  / Bili: Negative / Urobili: 0.2 E.U./dL   Blood: x / Protein: NEGATIVE mg/dL / Nitrite: NEGATIVE   Leuk Esterase: NEGATIVE / RBC: x / WBC x   Sq Epi: x / Non Sq Epi: x / Bacteria: x        RADIOLOGY & ADDITIONAL STUDIES:

## 2021-12-07 NOTE — PROGRESS NOTE ADULT - SUBJECTIVE AND OBJECTIVE BOX
76y old  Male who presents with a chief complaint of distal sigmoid obstruction     	  MEDICATIONS:  metoprolol tartrate 25 milliGRAM(s) Oral every 12 hours  tamsulosin 0.4 milliGRAM(s) Oral at bedtime      ALBUTerol    90 MICROgram(s) HFA Inhaler 1 Puff(s) Inhalation daily  albuterol/ipratropium for Nebulization 3 milliLiter(s) Nebulizer every 6 hours    HYDROmorphone  Injectable 1 milliGRAM(s) IV Push every 4 hours PRN  ondansetron Injectable 4 milliGRAM(s) IV Push every 6 hours PRN    saline laxative (FLEET) Rectal Enema 1 Enema Rectal at bedtime      dextrose 5% + sodium chloride 0.45% 1000 milliLiter(s) IV Continuous <Continuous>  heparin   Injectable 5000 Unit(s) SubCutaneous every 8 hours      Complaint:     Otherwise 12 point review of systems is normal.    PHYSICAL EXAM:    Constitutional:NAD  Eyes: PERRL, EOMI, sclera non-icteric  Neck: supple, no masses, no JVD  Respiratory: CTA b/l, good air entry b/l, no wheezing, rhonchi,  Cardiovascular: RRR, normal S1S2, no M/R/G  Gastrointestinal:  no masses palpable, BS +   Extremities:  no c/c/e  Neurological: AAOx3      ICU Vital Signs Last 24 Hrs  T(C): 36.8 (07 Dec 2021 21:59), Max: 36.8 (07 Dec 2021 21:59)  T(F): 98.3 (07 Dec 2021 21:59), Max: 98.3 (07 Dec 2021 21:59)  HR: 80 (07 Dec 2021 20:10) (80 - 98)  BP: 120/73 (07 Dec 2021 20:10) (120/73 - 134/77)  BP(mean): 92 (07 Dec 2021 20:10) (92 - 103)  ABP: --  ABP(mean): --  RR: 18 (07 Dec 2021 20:10) (17 - 18)  SpO2: 97% (07 Dec 2021 20:10) (92% - 97%)          LABS:	 	  CARDIAC MARKERS:                              15.6   8.54  )-----------( 244      ( 07 Dec 2021 07:46 )             46.0     12-07    139  |  104  |  14  ----------------------------<  113<H>  3.2<L>   |  20<L>  |  0.82    Ca    9.1      07 Dec 2021 07:46  Phos  3.0     12-07  Mg     2.2     12-07    TPro  7.3  /  Alb  4.4  /  TBili  0.7  /  DBili  x   /  AST  14  /  ALT  16  /  AlkPhos  57  12-06        ASSESSMENT/PLAN: 	      77 yo M PMH of asthma, HTN presented for abdominal distension and constipation found to have large bowel obstruction.    #Large Bowel Obstruction

## 2021-12-08 LAB
ANION GAP SERPL CALC-SCNC: 10 MMOL/L — SIGNIFICANT CHANGE UP (ref 5–17)
BUN SERPL-MCNC: 16 MG/DL — SIGNIFICANT CHANGE UP (ref 7–23)
CALCIUM SERPL-MCNC: 9.6 MG/DL — SIGNIFICANT CHANGE UP (ref 8.4–10.5)
CHLORIDE SERPL-SCNC: 105 MMOL/L — SIGNIFICANT CHANGE UP (ref 96–108)
CO2 SERPL-SCNC: 23 MMOL/L — SIGNIFICANT CHANGE UP (ref 22–31)
CREAT SERPL-MCNC: 0.88 MG/DL — SIGNIFICANT CHANGE UP (ref 0.5–1.3)
GLUCOSE SERPL-MCNC: 117 MG/DL — HIGH (ref 70–99)
HCT VFR BLD CALC: 45 % — SIGNIFICANT CHANGE UP (ref 39–50)
HGB BLD-MCNC: 14.8 G/DL — SIGNIFICANT CHANGE UP (ref 13–17)
MAGNESIUM SERPL-MCNC: 2.4 MG/DL — SIGNIFICANT CHANGE UP (ref 1.6–2.6)
MCHC RBC-ENTMCNC: 27.9 PG — SIGNIFICANT CHANGE UP (ref 27–34)
MCHC RBC-ENTMCNC: 32.9 GM/DL — SIGNIFICANT CHANGE UP (ref 32–36)
MCV RBC AUTO: 84.9 FL — SIGNIFICANT CHANGE UP (ref 80–100)
NRBC # BLD: 0 /100 WBCS — SIGNIFICANT CHANGE UP (ref 0–0)
PHOSPHATE SERPL-MCNC: 3.2 MG/DL — SIGNIFICANT CHANGE UP (ref 2.5–4.5)
PLATELET # BLD AUTO: 123 K/UL — LOW (ref 150–400)
POTASSIUM SERPL-MCNC: 4 MMOL/L — SIGNIFICANT CHANGE UP (ref 3.5–5.3)
POTASSIUM SERPL-SCNC: 4 MMOL/L — SIGNIFICANT CHANGE UP (ref 3.5–5.3)
RBC # BLD: 5.3 M/UL — SIGNIFICANT CHANGE UP (ref 4.2–5.8)
RBC # FLD: 14.6 % — HIGH (ref 10.3–14.5)
SODIUM SERPL-SCNC: 138 MMOL/L — SIGNIFICANT CHANGE UP (ref 135–145)
WBC # BLD: 9.41 K/UL — SIGNIFICANT CHANGE UP (ref 3.8–10.5)
WBC # FLD AUTO: 9.41 K/UL — SIGNIFICANT CHANGE UP (ref 3.8–10.5)

## 2021-12-08 PROCEDURE — 99232 SBSQ HOSP IP/OBS MODERATE 35: CPT

## 2021-12-08 RX ORDER — METOPROLOL TARTRATE 50 MG
25 TABLET ORAL EVERY 6 HOURS
Refills: 0 | Status: DISCONTINUED | OUTPATIENT
Start: 2021-12-08 | End: 2021-12-08

## 2021-12-08 RX ORDER — SOD SULF/SODIUM/NAHCO3/KCL/PEG
4000 SOLUTION, RECONSTITUTED, ORAL ORAL ONCE
Refills: 0 | Status: COMPLETED | OUTPATIENT
Start: 2021-12-08 | End: 2021-12-08

## 2021-12-08 RX ORDER — BUDESONIDE AND FORMOTEROL FUMARATE DIHYDRATE 160; 4.5 UG/1; UG/1
2 AEROSOL RESPIRATORY (INHALATION) DAILY
Refills: 0 | Status: DISCONTINUED | OUTPATIENT
Start: 2021-12-08 | End: 2021-12-10

## 2021-12-08 RX ORDER — MULTIVIT WITH MIN/MFOLATE/K2 340-15/3 G
1 POWDER (GRAM) ORAL ONCE
Refills: 0 | Status: COMPLETED | OUTPATIENT
Start: 2021-12-08 | End: 2021-12-08

## 2021-12-08 RX ORDER — METOPROLOL TARTRATE 50 MG
5 TABLET ORAL EVERY 6 HOURS
Refills: 0 | Status: DISCONTINUED | OUTPATIENT
Start: 2021-12-08 | End: 2021-12-10

## 2021-12-08 RX ORDER — TIOTROPIUM BROMIDE 18 UG/1
1 CAPSULE ORAL; RESPIRATORY (INHALATION) DAILY
Refills: 0 | Status: DISCONTINUED | OUTPATIENT
Start: 2021-12-08 | End: 2021-12-10

## 2021-12-08 RX ADMIN — Medication 3 MILLILITER(S): at 23:03

## 2021-12-08 RX ADMIN — Medication 5 MILLIGRAM(S): at 20:15

## 2021-12-08 RX ADMIN — Medication 1 ENEMA: at 06:15

## 2021-12-08 RX ADMIN — Medication 3 MILLILITER(S): at 05:51

## 2021-12-08 RX ADMIN — HEPARIN SODIUM 5000 UNIT(S): 5000 INJECTION INTRAVENOUS; SUBCUTANEOUS at 14:55

## 2021-12-08 RX ADMIN — Medication 25 MILLIGRAM(S): at 05:50

## 2021-12-08 RX ADMIN — Medication 1 ENEMA: at 21:32

## 2021-12-08 RX ADMIN — TIOTROPIUM BROMIDE 1 CAPSULE(S): 18 CAPSULE ORAL; RESPIRATORY (INHALATION) at 23:19

## 2021-12-08 RX ADMIN — Medication 25 MILLIGRAM(S): at 12:02

## 2021-12-08 RX ADMIN — Medication 5 MILLIGRAM(S): at 23:02

## 2021-12-08 RX ADMIN — BUDESONIDE AND FORMOTEROL FUMARATE DIHYDRATE 2 PUFF(S): 160; 4.5 AEROSOL RESPIRATORY (INHALATION) at 23:19

## 2021-12-08 RX ADMIN — SODIUM CHLORIDE 60 MILLILITER(S): 9 INJECTION, SOLUTION INTRAVENOUS at 12:03

## 2021-12-08 RX ADMIN — HEPARIN SODIUM 5000 UNIT(S): 5000 INJECTION INTRAVENOUS; SUBCUTANEOUS at 05:51

## 2021-12-08 RX ADMIN — SODIUM CHLORIDE 125 MILLILITER(S): 9 INJECTION, SOLUTION INTRAVENOUS at 16:51

## 2021-12-08 RX ADMIN — Medication 3 MILLILITER(S): at 12:02

## 2021-12-08 RX ADMIN — TAMSULOSIN HYDROCHLORIDE 0.4 MILLIGRAM(S): 0.4 CAPSULE ORAL at 21:32

## 2021-12-08 RX ADMIN — Medication 3 MILLILITER(S): at 19:14

## 2021-12-08 RX ADMIN — HEPARIN SODIUM 5000 UNIT(S): 5000 INJECTION INTRAVENOUS; SUBCUTANEOUS at 21:33

## 2021-12-08 RX ADMIN — Medication 4000 MILLILITER(S): at 10:25

## 2021-12-08 NOTE — PROGRESS NOTE ADULT - SUBJECTIVE AND OBJECTIVE BOX
Interventional Cardiology Adult Progress Note    Subjective Assessment:  No chest pain or dyspnea   No palpitations  	  MEDICATIONS:  metoprolol tartrate 25 milliGRAM(s) Oral every 6 hours  tamsulosin 0.4 milliGRAM(s) Oral at bedtime      ALBUTerol    90 MICROgram(s) HFA Inhaler 1 Puff(s) Inhalation daily  albuterol/ipratropium for Nebulization 3 milliLiter(s) Nebulizer every 6 hours    ondansetron Injectable 4 milliGRAM(s) IV Push every 6 hours PRN    saline laxative (FLEET) Rectal Enema 1 Enema Rectal at bedtime      dextrose 5% + sodium chloride 0.45% 1000 milliLiter(s) IV Continuous <Continuous>  heparin   Injectable 5000 Unit(s) SubCutaneous every 8 hours      	    [PHYSICAL EXAM:  TELEMETRY:  T(C): 36.7 (12-08-21 @ 10:24), Max: 36.8 (12-07-21 @ 21:59)  HR: 96 (12-08-21 @ 11:26) (80 - 96)  BP: 140/76 (12-08-21 @ 11:26) (120/73 - 140/76)  RR: 17 (12-08-21 @ 11:26) (17 - 18)  SpO2: 98% (12-08-21 @ 04:05) (95% - 98%)  Wt(kg): --  I&O's Summary    07 Dec 2021 07:01  -  08 Dec 2021 07:00  --------------------------------------------------------  IN: 1320 mL / OUT: 1900 mL / NET: -580 mL    08 Dec 2021 07:01  -  08 Dec 2021 11:54  --------------------------------------------------------  IN: 900 mL / OUT: 0 mL / NET: 900 mL        [place]:  Central/PICC/Mid Line:                                         Appearance: Normal	  HEENT:   Normal oral mucosa, PERRL, EOMI	  Neck: Supple,  - JVD; Carotid Bruit   Cardiovascular: Normal S1 S2, No JVD, No murmurs,   Respiratory: Lungs clear to auscultation/Decreased Breath Sounds/No Rales, Rhonchi, Wheezing	  Gastrointestinal:  Soft,  	    ECG:  	NSR, old inf MI  RADIOLOGY:   DIAGNOSTIC TESTING:  [ ] Echocardiogram:     1. Normal left ventricular size and systolic function. The basal inferior appears hypokinetic on some views.   2. Borderline dilated right ventricular size. Probably normal right ventricular systolic function.   3. Mild aortic stenosis.   4. Trivial pericardial effusion.   5. Mildly to moderately dilated ascending aorta.   6. No prior echo is available for comparison.    [ ]  Catheterization:  [ ] Stress Test:    [ ] FREDY  OTHER: 	    LABS:	 	  CARDIAC MARKERS:                                  14.8   9.41  )-----------( 123      ( 08 Dec 2021 06:52 )             45.0     12-08    138  |  105  |  16  ----------------------------<  117<H>  4.0   |  23  |  0.88    Ca    9.6      08 Dec 2021 06:52  Phos  3.2     12-08  Mg     2.4     12-08    TPro  7.3  /  Alb  4.4  /  TBili  0.7  /  DBili  x   /  AST  14  /  ALT  16  /  AlkPhos  57  12-06    proBNP:   Lipid Profile:   HgA1c:   TSH:   PT/INR - ( 07 Dec 2021 07:46 )   PT: 13.4 sec;   INR: 1.12          PTT - ( 07 Dec 2021 07:46 )  PTT:30.0 sec

## 2021-12-08 NOTE — PROGRESS NOTE ADULT - SUBJECTIVE AND OBJECTIVE BOX
Pt seen and examined at bedside.  Started on bowel prep with minimal flatus overnight.  Now reporting constipation and and no further flatus.  Return of abd distension.  Denies fever, chill, chest pain, shortness of breath, abdominal or epigastric pain, nausea, vomiting, hematemesis, diarrhea, constipation, melena, or hematochezia.     Allergies    No Known Allergies    Intolerances      MEDICATIONS:  MEDICATIONS  (STANDING):  ALBUTerol    90 MICROgram(s) HFA Inhaler 1 Puff(s) Inhalation daily  albuterol/ipratropium for Nebulization 3 milliLiter(s) Nebulizer every 6 hours  dextrose 5% + sodium chloride 0.45% 1000 milliLiter(s) (60 mL/Hr) IV Continuous <Continuous>  heparin   Injectable 5000 Unit(s) SubCutaneous every 8 hours  metoprolol tartrate 25 milliGRAM(s) Oral every 6 hours  saline laxative (FLEET) Rectal Enema 1 Enema Rectal at bedtime  tamsulosin 0.4 milliGRAM(s) Oral at bedtime    MEDICATIONS  (PRN):  ondansetron Injectable 4 milliGRAM(s) IV Push every 6 hours PRN Nausea    Vital Signs Last 24 Hrs  T(C): 37 (08 Dec 2021 14:36), Max: 37 (08 Dec 2021 14:36)  T(F): 98.6 (08 Dec 2021 14:36), Max: 98.6 (08 Dec 2021 14:36)  HR: 96 (08 Dec 2021 11:26) (80 - 96)  BP: 140/76 (08 Dec 2021 11:26) (120/73 - 140/76)  BP(mean): 102 (08 Dec 2021 11:26) (84 - 102)  RR: 17 (08 Dec 2021 11:26) (17 - 18)  SpO2: 98% (08 Dec 2021 04:05) (96% - 98%)     @ :  -   @ 07:00  --------------------------------------------------------  IN: 1320 mL / OUT: 1900 mL / NET: -580 mL     @ :  -   @ 16:42  --------------------------------------------------------  IN: 1140 mL / OUT: 500 mL / NET: 640 mL      PHYSICAL EXAM:    General: Comfortable in bed, in no acute distress  HEENT: MMM, conjunctiva and sclera clear  Gastrointestinal: Distended, nontender, no rebound or guarding  Skin: Warm and dry.     LABS:                        14.8   9.41  )-----------( 123      ( 08 Dec 2021 06:52 )             45.0     12    138  |  105  |  16  ----------------------------<  117<H>  4.0   |  23  |  0.88    Ca    9.6      08 Dec 2021 06:52  Phos  3.2     12-  Mg     2.4     12-08      PT/INR - ( 07 Dec 2021 07:46 )   PT: 13.4 sec;   INR: 1.12          PTT - ( 07 Dec 2021 07:46 )  PTT:30.0 sec      Urinalysis Basic - ( 06 Dec 2021 17:33 )    Color: Yellow / Appearance: Clear / S.010 / pH: x  Gluc: x / Ketone: Trace mg/dL  / Bili: Negative / Urobili: 0.2 E.U./dL   Blood: x / Protein: NEGATIVE mg/dL / Nitrite: NEGATIVE   Leuk Esterase: NEGATIVE / RBC: x / WBC x   Sq Epi: x / Non Sq Epi: x / Bacteria: x    Urinalysis with Rflx Culture (collected 06 Dec 2021 17:33)      RADIOLOGY & ADDITIONAL STUDIES: reviewed

## 2021-12-08 NOTE — PROGRESS NOTE ADULT - ATTENDING COMMENTS
More bloated, nauseated after golytely.  Not passing flatus.  Abd more distended, still soft, NT    Hold off on further po intake  To be evaluated by Dr. Thornton for other endoscopic options.  If none, will need diverting colostomy.

## 2021-12-08 NOTE — CHART NOTE - NSCHARTNOTEFT_GEN_A_CORE
Pt admits of increasing abd discomfort. Pain controlled with meds. He admits of some nausea after drinking 2 liters of golytely but no vomiting. Admits to 3 liquid BMs in am. Denies fever, chills.  abd Exam: Soft but distended. non tender to palpation. No rebound or guarding.   GI consulted to place a stent in sigmoid stricture. However, planned to perform procedure tomorrow.   Pt made NPO and IVF was started.   Will continue to monitor and perform DEMI.

## 2021-12-08 NOTE — PROGRESS NOTE ADULT - SUBJECTIVE AND OBJECTIVE BOX
Sigmoidoscopy results discussed with Dr. Thornton.  No stent placed given easy passage of scope through short segment benign stricture.  Feels well since sigmoidoscopy without abdominal pain.  Passed small amount of flatus with Fleet enema.  Received Miralax yesterday afternoon.    AFVSS  Abd soft, moderately distended (slightly more than last night). nontender.    Labs yesterday ok    A/P: Large bowel obstruction likely due to diverticular stricture in sigmoid colon.    1. Golytely slow prep (1 cup every 45 mins)  2. Monitor abdominal exam and tolerance of prep  3. clear liquids only.

## 2021-12-08 NOTE — CHART NOTE - NSCHARTNOTEFT_GEN_A_CORE
Received detailed report regarding patient's status and concern of stricture from Dr. Pedroza and Dr. Hendrix.     Bedside evaluation reveals patient who is resting in bed, no increase in nausea, states he is not in pain.  AAOx3  no respiratory distress  abdomen distended, but non tender, nonperitonitic.     discussed the plan with patient- with ideal plan for sigmoidoscopy, decompression and stent placement tomorrow, vs urgent operative tonight if any clinical changes that indicate perforation.     Discussed the signs of concern with the nurse caring for the patient as well, and alerted nurse to report to team if any clinical changes occur between our serial abdominal exams.

## 2021-12-08 NOTE — PROGRESS NOTE ADULT - SUBJECTIVE AND OBJECTIVE BOX
Interventional, Pulmonary, Critical, Chest Special Procedures.    Pt was seen and fully examined by myself.     Time spent with patient in minutes:77    Patient is a 76y old  Male who presents with a chief complaint of distal sigmoid obstruction (08 Dec 2021 09:53) The patient complaining of worsening bloating sensation, no flatus, no new pain pattern, denies vomiting, no new wheezing sensation.     HPI:  77 yo M PMH of asthma, HTN presented for abdominal distension, not being able to pass a decent bowel movement or gas in 3-4 days. He also endorsed abdominal distension, discomfort and diffuse pain. He denied nausea or vomiting but been hiccuping. He denied any personal or family hx of colon cancer or any cancer for that matter. His last Cscope was 2 years ago and reportedly was normal. Off note, he denied any prior abdominal surgeries.     In the ED, he was tachycardiac to 115 and systolic 180 mmHg as he skipped his home BP medications. His labs are unremarkable. CT showed distal sigmoid obstruction/apple core lesion. Maximum cecal diameter is 11cm.  (06 Dec 2021 20:53)      REVIEW OF SYSTEMS:  Constitutional: No fever, weight loss, chills +++fatigue  Eyes: No eye pain, visual disturbances, or discharge  ENMT:  No difficulty hearing, tinnitus, vertigo; No sinus or throat pain. No epistaxis, dysphagia, dysphonia, hoarseness or odynophagia  Neck: No pain, stiffness or neck swelling.  No masses or deformities  Respiratory: chronic cough, no wheezing, hemoptysis  - COPD  - ILD   - PE   + ASTHMA     - PNEUMONIA  Cardiovascular: No chest pain, dysrhythmia, palpitations, dizziness or edema - CAD   - CHF   + HTN  Gastrointestinal: + abdominal or epigastric pain. + nausea, no vomiting or hematemesis; No diarrhea or constipation. No melena or hematochezia, Icterus.          Genitourinary: No dysuria, frequency, hematuria or incontinence   - CKD/ROSS      - ESRD  Neurological: No headaches, memory loss, loss of strength, numbness or tremors      -DEMENTIA     - STROKE    - SEIZURE  Skin: No itching, burning, rashes or lesions   Lymph Nodes: No enlarged glands  Endocrine: No heat or cold intolerance; No hair loss       - DM     - THYROID DISORDER  Musculoskeletal: No joint pain or swelling; No muscle, back or extremity pain, No edema  Psychiatric: No depression, anxiety, mood swings or difficulty sleeping  Heme/Lymph: No easy bruising or bleeding gums         - ANEMIA      - CANCER   -COAGULOPATHY  Allergy and Immunologic: No hives or eczema    PAST MEDICAL & SURGICAL HISTORY:  Asthma    Hypertension    History of BPH      FAMILY HISTORY:    SOCIAL HISTORY:      - Tobacco     - ETOH    Allergies    No Known Allergies    Intolerances      Vital Signs Last 24 Hrs  T(C): 36.7 (08 Dec 2021 10:24), Max: 36.8 (07 Dec 2021 21:59)  T(F): 98 (08 Dec 2021 10:24), Max: 98.3 (07 Dec 2021 21:59)  HR: 96 (08 Dec 2021 11:26) (80 - 96)  BP: 140/76 (08 Dec 2021 11:26) (120/73 - 140/76)  BP(mean): 102 (08 Dec 2021 11:26) (84 - 102)  RR: 17 (08 Dec 2021 11:26) (17 - 18)  SpO2: 98% (08 Dec 2021 04:05) (96% - 98%)    12-07 @ 07:01  -  12-08 @ 07:00  --------------------------------------------------------  IN: 1320 mL / OUT: 1900 mL / NET: -580 mL    12-08 @ 07:01  -  12-08 @ 13:48  --------------------------------------------------------  IN: 900 mL / OUT: 0 mL / NET: 900 mL          MEDICATIONS:  MEDICATIONS  (STANDING):  ALBUTerol    90 MICROgram(s) HFA Inhaler 1 Puff(s) Inhalation daily  albuterol/ipratropium for Nebulization 3 milliLiter(s) Nebulizer every 6 hours  dextrose 5% + sodium chloride 0.45% 1000 milliLiter(s) (60 mL/Hr) IV Continuous <Continuous>  heparin   Injectable 5000 Unit(s) SubCutaneous every 8 hours  metoprolol tartrate 25 milliGRAM(s) Oral every 6 hours  saline laxative (FLEET) Rectal Enema 1 Enema Rectal at bedtime  tamsulosin 0.4 milliGRAM(s) Oral at bedtime    MEDICATIONS  (PRN):  ondansetron Injectable 4 milliGRAM(s) IV Push every 6 hours PRN Nausea      PHYSICAL EXAM:  Un Comfortable, no immediate distress  Eyes: PERRL, EOM intact; conjunctiva and sclera clear  Head: Normocephalic;  No Trauma  ENMT: No nasal discharge, hoarseness, cough or hemoptysis  Neck: Supple; non tender; no masses or deformities.    No JVD  Respiratory: CTA,  - WHEEZING   - RHONCHI  - RALES  - CRACKLES.  Diminished breath sounds  BILATERAL  RIGHT  LEFT bases c more inspiratory splinting   Cardiovascular: Regular rate and rhythm. S1 and S2 Normal; No murmurs, gallops or rubs     - PPM/AICD  Gastrointestinal: Soft obese, mildly tender, +distended; decreased bowel sounds; No hepatosplenomegaly.     -PEG    -  GT   - HYDE  Genitourinary: No costovertebral angle tenderness. No dysuria  Extremities: AROM, No clubbing, cyanosis or edema    Vascular: Peripheral pulses palpable 2+ bilaterally  Neurological: Alert and responisve to stimuli   Skin: Warm and dry. No obvious rash  Lymph Nodes: No acute cervical or supraclavicular adenopathy  Psychiatric: Cooperative and appropriate mood    DEVICES:  - DENTURES   +IV R / L     - ETUBE   -TRACH   -CTUBE  R / L    LABS:                          14.8   9.41  )-----------( 123      ( 08 Dec 2021 06:52 )             45.0     12-08    138  |  105  |  16  ----------------------------<  117<H>  4.0   |  23  |  0.88    Ca    9.6      08 Dec 2021 06:52  Phos  3.2     12-08  Mg     2.4     12-08    TPro  7.3  /  Alb  4.4  /  TBili  0.7  /  DBili  x   /  AST  14  /  ALT  16  /  AlkPhos  57  12-06    PT/INR - ( 07 Dec 2021 07:46 )   PT: 13.4 sec;   INR: 1.12          PTT - ( 07 Dec 2021 07:46 )  PTT:30.0 sec  RADIOLOGY & ADDITIONAL STUDIES (The following images were personally reviewed):

## 2021-12-08 NOTE — PROGRESS NOTE ADULT - SUBJECTIVE AND OBJECTIVE BOX
76y old  Male who presents with a chief complaint of distal sigmoid obstruction       Feeling better s/p receiving  2L of GoLytely      	  MEDICATIONS:  metoprolol tartrate Injectable 5 milliGRAM(s) IV Push every 6 hours  tamsulosin 0.4 milliGRAM(s) Oral at bedtime      ALBUTerol    90 MICROgram(s) HFA Inhaler 1 Puff(s) Inhalation daily  albuterol/ipratropium for Nebulization 3 milliLiter(s) Nebulizer every 6 hours  budesonide  80 MICROgram(s)/formoterol 4.5 MICROgram(s) Inhaler 2 Puff(s) Inhalation daily  tiotropium 18 MICROgram(s) Capsule 1 Capsule(s) Inhalation daily    ondansetron Injectable 4 milliGRAM(s) IV Push every 6 hours PRN    saline laxative (FLEET) Rectal Enema 1 Enema Rectal at bedtime      dextrose 5% + sodium chloride 0.45% 1000 milliLiter(s) IV Continuous <Continuous>  heparin   Injectable 5000 Unit(s) SubCutaneous every 8 hours      Complaint:     Otherwise 12 point review of systems is normal.    PHYSICAL EXAM:    Constitutional: NAD  Eyes: PERRL, EOMI, sclera non-icteric  Neck: supple, no masses, no JVD  Respiratory: CTA b/l, good air entry b/l, no wheezing, rhonchi, rales,   Cardiovascular: RRR, normal S1S2, no M/R/G  Gastrointestinal: soft, Distended  Extremities:  no c/c/e  Neurological: AAOx3      ICU Vital Signs Last 24 Hrs  T(C): 37.1 (08 Dec 2021 17:59), Max: 37.1 (08 Dec 2021 17:59)  T(F): 98.8 (08 Dec 2021 17:59), Max: 98.8 (08 Dec 2021 17:59)  HR: 92 (08 Dec 2021 20:15) (86 - 96)  BP: 147/70 (08 Dec 2021 20:15) (124/72 - 147/70)  BP(mean): 100 (08 Dec 2021 20:15) (84 - 102)  ABP: --  ABP(mean): --  RR: 17 (08 Dec 2021 20:15) (17 - 18)  SpO2: 95% (08 Dec 2021 20:15) (91% - 98%)          LABS:	 	  CARDIAC MARKERS:                              14.8   9.41  )-----------( 123      ( 08 Dec 2021 06:52 )             45.0     12-08    138  |  105  |  16  ----------------------------<  117<H>  4.0   |  23  |  0.88    Ca    9.6      08 Dec 2021 06:52  Phos  3.2     12-08  Mg     2.4     12-08            ASSESSMENT/PLAN: 	    75 yo M PMH of asthma, HTN presented for abdominal distension and constipation found to have large bowel obstruction s/p endoscopic decompression       As per orders

## 2021-12-08 NOTE — CHART NOTE - NSCHARTNOTEFT_GEN_A_CORE
Serial Abdominal exam @ 21:00    Patient seen and examined at bedside. Reports that he is feeling better than he was earlier today. Denies any nausea or vomiting currently, denies any bowel movements or flatus. Reports that he feels that his stomach is tight but he is less uncomfortable. Denies any fever, chills, chest pain or shortness of breath    Constitutional: AAOx3, no acute distress  HEENT: NCAT, airway patent  Cardiovascular: RRR, pulses present bilaterally  Respiratory: nonlabored breathing  Gastrointestinal: abdomen soft, obese, nontender, distended, tympanic, no rebound or guarding  Neuro: no focal deficits    ICU Vital Signs Last 24 Hrs  T(C): 37.1 (08 Dec 2021 17:59), Max: 37.1 (08 Dec 2021 17:59)  T(F): 98.8 (08 Dec 2021 17:59), Max: 98.8 (08 Dec 2021 17:59)  HR: 92 (08 Dec 2021 20:15) (86 - 96)  BP: 147/70 (08 Dec 2021 20:15) (124/72 - 147/70)  BP(mean): 100 (08 Dec 2021 20:15) (84 - 102)  ABP: --  ABP(mean): --  RR: 17 (08 Dec 2021 20:15) (17 - 18)  SpO2: 95% (08 Dec 2021 20:15) (91% - 98%)

## 2021-12-08 NOTE — PROGRESS NOTE ADULT - SUBJECTIVE AND OBJECTIVE BOX
STATUS POST:  flex sig    POST PROCEDURE DAY #: 1    SUBJECTIVE:  patient examined sitting in chair by suregery team this AM.  Patient has no complaints. tolerating CLD. Denies passing any flatus or BMs.  patient denies N/V, fever/chills, CP, or SOB.    MEDICATIONS  (STANDING):  ALBUTerol    90 MICROgram(s) HFA Inhaler 1 Puff(s) Inhalation daily  albuterol/ipratropium for Nebulization 3 milliLiter(s) Nebulizer every 6 hours  dextrose 5% + sodium chloride 0.45% 1000 milliLiter(s) (60 mL/Hr) IV Continuous <Continuous>  heparin   Injectable 5000 Unit(s) SubCutaneous every 8 hours  metoprolol tartrate 25 milliGRAM(s) Oral every 12 hours  saline laxative (FLEET) Rectal Enema 1 Enema Rectal at bedtime  tamsulosin 0.4 milliGRAM(s) Oral at bedtime    MEDICATIONS  (PRN):  ondansetron Injectable 4 milliGRAM(s) IV Push every 6 hours PRN Nausea      Vital Signs Last 24 Hrs  T(C): 36.8 (08 Dec 2021 05:07), Max: 36.8 (07 Dec 2021 21:59)  T(F): 98.3 (08 Dec 2021 05:07), Max: 98.3 (07 Dec 2021 21:59)  HR: 86 (08 Dec 2021 04:05) (80 - 98)  BP: 129/81 (08 Dec 2021 04:05) (120/73 - 133/79)  BP(mean): 100 (08 Dec 2021 04:05) (92 - 103)  RR: 17 (08 Dec 2021 04:05) (17 - 18)  SpO2: 98% (08 Dec 2021 04:05) (93% - 98%)    PHYSICAL EXAM:    Constitutional: A&Ox3    Respiratory: non labored breathing, no respiratory distress    Cardiovascular: NSR, RRR    Gastrointestinal: abdomen soft, moderately distended, non-tender, no guarding, no rebound    Genitourinary: voiding appropriately    Extremities: (-) edema, - calf tenderness                  I&O's Detail    07 Dec 2021 07:01  -  08 Dec 2021 07:00  --------------------------------------------------------  IN:    dextrose 5% + sodium chloride 0.45% w/ Additives: 900 mL    Lactated Ringers: 420 mL  Total IN: 1320 mL    OUT:    Voided (mL): 1900 mL  Total OUT: 1900 mL    Total NET: -580 mL          LABS:                        15.6   8.54  )-----------( 244      ( 07 Dec 2021 07:46 )             46.0     12    139  |  104  |  14  ----------------------------<  113<H>  3.2<L>   |  20<L>  |  0.82    Ca    9.1      07 Dec 2021 07:46  Phos  3.0       Mg     2.2         TPro  7.3  /  Alb  4.4  /  TBili  0.7  /  DBili  x   /  AST  14  /  ALT  16  /  AlkPhos  57  12-    PT/INR - ( 07 Dec 2021 07:46 )   PT: 13.4 sec;   INR: 1.12          PTT - ( 07 Dec 2021 07:46 )  PTT:30.0 sec  Urinalysis Basic - ( 06 Dec 2021 17:33 )    Color: Yellow / Appearance: Clear / S.010 / pH: x  Gluc: x / Ketone: Trace mg/dL  / Bili: Negative / Urobili: 0.2 E.U./dL   Blood: x / Protein: NEGATIVE mg/dL / Nitrite: NEGATIVE   Leuk Esterase: NEGATIVE / RBC: x / WBC x   Sq Epi: x / Non Sq Epi: x / Bacteria: x

## 2021-12-08 NOTE — CHART NOTE - NSCHARTNOTEFT_GEN_A_CORE
Reports nausea after drinking 2L Golytely. Denies emesis. Small bowel movements after enemas in AM, but none since then after drinking Golytely solution. Denies flatus. Vitals stable. Increasing distension on exam, but soft. Nontender, no signs of peritonitis. Discussed with attending and GI. Plan for repeat sigmoidoscopy, decompression, and stent placement. Patient made NPO and placed on IVF. Serial abdominal exams. If tenderness, signs of peritonitis, or derangement in vital signs, may need operative intervention.

## 2021-12-09 ENCOUNTER — TRANSCRIPTION ENCOUNTER (OUTPATIENT)
Age: 76
End: 2021-12-09

## 2021-12-09 LAB
ANION GAP SERPL CALC-SCNC: 13 MMOL/L — SIGNIFICANT CHANGE UP (ref 5–17)
BUN SERPL-MCNC: 11 MG/DL — SIGNIFICANT CHANGE UP (ref 7–23)
CALCIUM SERPL-MCNC: 8.6 MG/DL — SIGNIFICANT CHANGE UP (ref 8.4–10.5)
CHLORIDE SERPL-SCNC: 104 MMOL/L — SIGNIFICANT CHANGE UP (ref 96–108)
CO2 SERPL-SCNC: 18 MMOL/L — LOW (ref 22–31)
CREAT SERPL-MCNC: 0.71 MG/DL — SIGNIFICANT CHANGE UP (ref 0.5–1.3)
GLUCOSE SERPL-MCNC: 130 MG/DL — HIGH (ref 70–99)
HCT VFR BLD CALC: 45.1 % — SIGNIFICANT CHANGE UP (ref 39–50)
HGB BLD-MCNC: 14.6 G/DL — SIGNIFICANT CHANGE UP (ref 13–17)
MAGNESIUM SERPL-MCNC: 2.1 MG/DL — SIGNIFICANT CHANGE UP (ref 1.6–2.6)
MCHC RBC-ENTMCNC: 27.6 PG — SIGNIFICANT CHANGE UP (ref 27–34)
MCHC RBC-ENTMCNC: 32.4 GM/DL — SIGNIFICANT CHANGE UP (ref 32–36)
MCV RBC AUTO: 85.3 FL — SIGNIFICANT CHANGE UP (ref 80–100)
NRBC # BLD: 0 /100 WBCS — SIGNIFICANT CHANGE UP (ref 0–0)
PHOSPHATE SERPL-MCNC: 2.6 MG/DL — SIGNIFICANT CHANGE UP (ref 2.5–4.5)
PLATELET # BLD AUTO: 150 K/UL — SIGNIFICANT CHANGE UP (ref 150–400)
POTASSIUM SERPL-MCNC: 3.3 MMOL/L — LOW (ref 3.5–5.3)
POTASSIUM SERPL-SCNC: 3.3 MMOL/L — LOW (ref 3.5–5.3)
RBC # BLD: 5.29 M/UL — SIGNIFICANT CHANGE UP (ref 4.2–5.8)
RBC # FLD: 14.6 % — HIGH (ref 10.3–14.5)
SODIUM SERPL-SCNC: 135 MMOL/L — SIGNIFICANT CHANGE UP (ref 135–145)
WBC # BLD: 9.53 K/UL — SIGNIFICANT CHANGE UP (ref 3.8–10.5)
WBC # FLD AUTO: 9.53 K/UL — SIGNIFICANT CHANGE UP (ref 3.8–10.5)

## 2021-12-09 PROCEDURE — 45347 SIGMOIDOSCOPY W/PLCMT STENT: CPT

## 2021-12-09 RX ORDER — POTASSIUM CHLORIDE 20 MEQ
20 PACKET (EA) ORAL ONCE
Refills: 0 | Status: COMPLETED | OUTPATIENT
Start: 2021-12-09 | End: 2021-12-09

## 2021-12-09 RX ORDER — SODIUM,POTASSIUM PHOSPHATES 278-250MG
1 POWDER IN PACKET (EA) ORAL ONCE
Refills: 0 | Status: COMPLETED | OUTPATIENT
Start: 2021-12-09 | End: 2021-12-09

## 2021-12-09 RX ADMIN — TAMSULOSIN HYDROCHLORIDE 0.4 MILLIGRAM(S): 0.4 CAPSULE ORAL at 22:12

## 2021-12-09 RX ADMIN — BUDESONIDE AND FORMOTEROL FUMARATE DIHYDRATE 2 PUFF(S): 160; 4.5 AEROSOL RESPIRATORY (INHALATION) at 10:58

## 2021-12-09 RX ADMIN — Medication 3 MILLILITER(S): at 06:23

## 2021-12-09 RX ADMIN — Medication 20 MILLIEQUIVALENT(S): at 09:58

## 2021-12-09 RX ADMIN — HEPARIN SODIUM 5000 UNIT(S): 5000 INJECTION INTRAVENOUS; SUBCUTANEOUS at 06:23

## 2021-12-09 RX ADMIN — Medication 5 MILLIGRAM(S): at 17:26

## 2021-12-09 RX ADMIN — Medication 1 PACKET(S): at 09:58

## 2021-12-09 RX ADMIN — Medication 5 MILLIGRAM(S): at 06:24

## 2021-12-09 RX ADMIN — HEPARIN SODIUM 5000 UNIT(S): 5000 INJECTION INTRAVENOUS; SUBCUTANEOUS at 22:12

## 2021-12-09 RX ADMIN — Medication 3 MILLILITER(S): at 10:56

## 2021-12-09 RX ADMIN — TIOTROPIUM BROMIDE 1 CAPSULE(S): 18 CAPSULE ORAL; RESPIRATORY (INHALATION) at 10:58

## 2021-12-09 RX ADMIN — HEPARIN SODIUM 5000 UNIT(S): 5000 INJECTION INTRAVENOUS; SUBCUTANEOUS at 14:24

## 2021-12-09 RX ADMIN — Medication 5 MILLIGRAM(S): at 10:56

## 2021-12-09 RX ADMIN — Medication 3 MILLILITER(S): at 17:26

## 2021-12-09 NOTE — DISCHARGE NOTE PROVIDER - HOSPITAL COURSE
77 yo M PMH of asthma, HTN presented on day of admission for abdominal distension, not being able to pass a decent bowel movement or gas in 3-4 days. He also endorsed abdominal distension, discomfort and diffuse pain. He denied nausea or vomiting but been hiccuping. He denied any personal or family hx of colon cancer or any cancer for that matter. His last Cscope was 2 years ago and reportedly was normal. Off note, he denied any prior abdominal surgeries. Admitted for CT findings of large bowel obstruction, secondary to an annular/apple core lesion within the distal sigmoid colon which is suspicious for neoplasm-colonic adenocarcinoma. Patient underwent flex sig on 12/7,  which found stricture noted and colon cleaned, no stent placed; cardiology also consulted for comanagement for preoperative clearance. On 12/8 patient was re-prepped for scope after distendion when having PO intake. 12/9 patient underwent sigmoidoscopy and a stent was placed. The remainder of his hospital course was unremarkable with advancement of diet, passing trial of void, and pain control. On day of discharge patient was stable to be d/c'd home. 77 yo M PMH of asthma, HTN presented on day of admission for abdominal distension, not being able to pass a decent bowel movement or gas in 3-4 days. He also endorsed abdominal distension, discomfort and diffuse pain. He denied nausea or vomiting but been hiccuping. He denied any personal or family hx of colon cancer or any cancer for that matter. His last Cscope was 2 years ago and reportedly was normal. Off note, he denied any prior abdominal surgeries. Admitted for CT findings of large bowel obstruction, secondary to an annular/apple core lesion within the distal sigmoid colon which is suspicious for neoplasm-colonic adenocarcinoma. Patient underwent flex sig on 12/7,  which found stricture noted and colon cleaned, no stent placed; cardiology also consulted for comanagement for preoperative clearance. On 12/8 patient was re-prepped for scope after distendion when having PO intake. 12/9 patient underwent sigmoidoscopy and a stent was placed. The remainder of his hospital course was unremarkable with advancement of diet, passing trial of void, and pain control. On day of discharge patient was stable to be d/c'd home after he began passing consistent bowel movements.

## 2021-12-09 NOTE — DISCHARGE NOTE PROVIDER - NSDCFUADDAPPT_GEN_ALL_CORE_FT
1. Please take metoprolol 50mg twice daily as prescribed per Dr. Engel; please follow-up with Dr. Engel in the Cardiology clinic in 1-2 weeks regarding continuation of your metoprolol.  1. Please take metoprolol 50mg twice daily as prescribed per Dr. Engel; please follow-up with Dr. Engel in the Cardiology clinic in 1-2 weeks regarding continuation of your metoprolol.   2. Please follow up w/ Dr. Thornton () in 1-2 weeks or as directed. Call the office at [phone] to schedule your appt.

## 2021-12-09 NOTE — DISCHARGE NOTE PROVIDER - NSDCCPCAREPLAN_GEN_ALL_CORE_FT
PRINCIPAL DISCHARGE DIAGNOSIS  Diagnosis: Bowel obstruction  Assessment and Plan of Treatment: s/p sigmoidoscopy and stent placement

## 2021-12-09 NOTE — CHART NOTE - NSCHARTNOTEFT_GEN_A_CORE
Procedure Note  Flex sig  Attending Dr. Harden  See result for more details    Impressions:   A benign appearing stricture was seen in the colon at 20 cm. This area could be  easily passed with the therapeutic double channel scope. The area proximal was  dilated and contained liquid stool. A 90 mm uncovered metal colonic stent was  placed across the stricture with the distal end terminating at 15 cm. Copious  liquid stool passed after the stent was placed. .     Plan: F/u as planned with Colorectal Surgeon for resection of affected area

## 2021-12-09 NOTE — DIETITIAN INITIAL EVALUATION ADULT. - OTHER INFO
Pt 77 yo M PMH of asthma, HTN presented for abdominal distension, not being able to pass a decent bowel movement or gas in 3-4 days. CT showed distal sigmoid obstruction/ apple core lesion. S/P flex sig on 12/7 which showed benign appearing sigmoid stricture, dilated colon proximal to stricture. Plan for resection next week after decompression over next few days per colorectal surgery. On clear liquid diet and tolerating, ate ~75% of tray which include icy, jello, juice, hot tea and did not like taste of broth. Denies N/V or D/C. + flatus and BMs.   Skin: intact, no breakdown  -Charles score 19  Pain: Denies     Pt denies any recent weight changes. Per EMR, wt stable x2 years.   7/02/2019: 99.8

## 2021-12-09 NOTE — PROGRESS NOTE ADULT - SUBJECTIVE AND OBJECTIVE BOX
SERIAL ABDOMINAL EXAM/SENIOR RESIDENT NOTE    Continues to have frequent small volume watery bowel movements and flatus. No abdominal pain, nausea or vomiting. VSS. Not tachycardic. Abdomen distended but improving, soft, nontender. No recent labs. Continue decompression overnight, possible discharge tomorrow. SERIAL ABDOMINAL EXAM/SENIOR RESIDENT NOTE    Continues to have frequent small volume watery bowel movements and flatus. No abdominal pain, nausea or vomiting. VSS. Not tachycardic. Abdomen distended but improving, soft, nontender. No interval labs. Continue decompression overnight, possible discharge tomorrow. Will follow up as an outpatient for eventual colectomy.

## 2021-12-09 NOTE — CHART NOTE - NSCHARTNOTESELECT_GEN_ALL_CORE
Event Note
Flex Sig/Event Note
Event Note
Flex Sig/Event Note

## 2021-12-09 NOTE — DISCHARGE NOTE PROVIDER - NSDCMRMEDTOKEN_GEN_ALL_CORE_FT
albuterol:   Incruse Ellipta 62.5 mcg/inh inhalation powder:   tamsulosin 0.4 mg oral capsule: 1 cap(s) orally once a day   albuterol 90 mcg/inh inhalation aerosol: 1 puff(s) inhaled once a day  Incruse Ellipta 62.5 mcg/inh inhalation powder:   metoprolol tartrate 50 mg oral tablet: 1 tab(s) orally 2 times a day   tamsulosin 0.4 mg oral capsule: 1 cap(s) orally once a day

## 2021-12-09 NOTE — DIETITIAN INITIAL EVALUATION ADULT. - ORAL INTAKE PTA/DIET HISTORY
Spoke with pt in room this morning. States appetite prior to admit was intact, eating/drinking a variety of foods and beverages. No cultural, ethnic, Mandaen food preferences noted, NKFA.

## 2021-12-09 NOTE — DISCHARGE NOTE PROVIDER - CARE PROVIDERS DIRECT ADDRESSES
,DirectAddress_Unknown,[email or web address],DirectAddress_Unknown

## 2021-12-09 NOTE — CHART NOTE - NSCHARTNOTEFT_GEN_A_CORE
Serial Abdominal Exam @ 03:00    Patient seen and examined at bedside. Reports that his status is the same as his previous exam tonight, denies any nausea, vomiting, bowel movements or flatus. Reports that his stomach does not feel any more or less distended than earlier. Denies any fever, chills, chest pain, or shortness of breath.    Constitutional: AAOx3, no acute distress  HEENT: NCAT, airway patent  Cardiovascular: RRR, pulses present bilaterally  Respiratory: nonlabored breathing  Gastrointestinal: abdomen soft, obese, nontender, distended, tympanic, no rebound or guarding  Neuro: no focal deficits    ICU Vital Signs Last 24 Hrs  T(C): 37.6 (08 Dec 2021 21:58), Max: 37.6 (08 Dec 2021 21:58)  T(F): 99.6 (08 Dec 2021 21:58), Max: 99.6 (08 Dec 2021 21:58)  HR: 92 (09 Dec 2021 00:25) (86 - 96)  BP: 142/87 (09 Dec 2021 00:25) (125/60 - 147/70)  BP(mean): 109 (09 Dec 2021 00:25) (84 - 109)  ABP: --  ABP(mean): --  RR: 17 (09 Dec 2021 00:25) (17 - 18)  SpO2: 97% (09 Dec 2021 00:25) (91% - 98%)

## 2021-12-09 NOTE — PROGRESS NOTE ADULT - SUBJECTIVE AND OBJECTIVE BOX
Interventional, Pulmonary, Critical, Chest Special Procedures.    Pt was seen and fully examined by myself.     Time spent with patient in minutes:87    Patient is a 76y old  Male who presents with a chief complaint of distal sigmoid obstruction (09 Dec 2021 13:38) Asked to mange this high risk patient periop undergoing LGI decompression procedure. At the time of note the pt awake, complaining of abdominal distention, breathing pattern unchanged c persistent inspiratory splinting, the patient denies any new aspiration, denies dysuria.    HPI:  77 yo M PMH of asthma, HTN presented for abdominal distension, not being able to pass a decent bowel movement or gas in 3-4 days. He also endorsed abdominal distension, discomfort and diffuse pain. He denied nausea or vomiting but been hiccuping. He denied any personal or family hx of colon cancer or any cancer for that matter. His last Cscope was 2 years ago and reportedly was normal. Off note, he denied any prior abdominal surgeries.     In the ED, he was tachycardiac to 115 and systolic 180 mmHg as he skipped his home BP medications. His labs are unremarkable. CT showed distal sigmoid obstruction/apple core lesion. Maximum cecal diameter is 11cm.  (06 Dec 2021 20:53)      REVIEW OF SYSTEMS:  Constitutional: No fever, weight loss, chills +++fatigue  Eyes: No eye pain, visual disturbances, or discharge  ENMT:  No difficulty hearing, tinnitus, vertigo; No sinus or throat pain. No epistaxis, dysphagia, dysphonia, hoarseness or odynophagia  Neck: No pain, stiffness or neck swelling.  No masses or deformities  Respiratory: chronic cough, no wheezing, hemoptysis  - COPD  - ILD   - PE   + ASTHMA     - PNEUMONIA  Cardiovascular: No chest pain, dysrhythmia, palpitations, dizziness or edema - CAD   - CHF   + HTN  Gastrointestinal: + abdominal or epigastric pain. + nausea, no vomiting or hematemesis; No diarrhea or constipation. No melena or hematochezia, Icterus.          Genitourinary: No dysuria, frequency, hematuria or incontinence   - CKD/ROSS      - ESRD  Neurological: No headaches, memory loss, loss of strength, numbness or tremors      -DEMENTIA     - STROKE    - SEIZURE  Skin: No itching, burning, rashes or lesions   Lymph Nodes: No enlarged glands  Endocrine: No heat or cold intolerance; No hair loss       - DM     - THYROID DISORDER  Musculoskeletal: No joint pain or swelling; No muscle, back or extremity pain, No edema  Psychiatric: No depression, anxiety, mood swings or difficulty sleeping  Heme/Lymph: No easy bruising or bleeding gums         - ANEMIA      - CANCER   -COAGULOPATHY  Allergy and Immunologic: No hives or eczema    PAST MEDICAL & SURGICAL HISTORY:  Asthma    Hypertension    History of BPH      FAMILY HISTORY:    SOCIAL HISTORY:      - Tobacco     - ETOH    Allergies    No Known Allergies    Intolerances      Vital Signs Last 24 Hrs  T(C): 37.4 (09 Dec 2021 13:47), Max: 37.6 (08 Dec 2021 21:58)  T(F): 99.4 (09 Dec 2021 13:47), Max: 99.6 (08 Dec 2021 21:58)  HR: 86 (09 Dec 2021 14:29) (85 - 102)  BP: 144/75 (09 Dec 2021 14:29) (125/58 - 163/77)  BP(mean): 100 (09 Dec 2021 14:29) (84 - 111)  RR: 18 (09 Dec 2021 14:29) (17 - 18)  SpO2: 95% (09 Dec 2021 14:29) (91% - 97%)    12-08 @ 07:01  -  12-09 @ 07:00  --------------------------------------------------------  IN: 3015 mL / OUT: 1000 mL / NET: 2015 mL    12-09 @ 07:01  -  12-09 @ 15:08  --------------------------------------------------------  IN: 0 mL / OUT: 600 mL / NET: -600 mL          MEDICATIONS:  MEDICATIONS  (STANDING):  ALBUTerol    90 MICROgram(s) HFA Inhaler 1 Puff(s) Inhalation daily  albuterol/ipratropium for Nebulization 3 milliLiter(s) Nebulizer every 6 hours  budesonide  80 MICROgram(s)/formoterol 4.5 MICROgram(s) Inhaler 2 Puff(s) Inhalation daily  dextrose 5% + sodium chloride 0.45% 1000 milliLiter(s) (125 mL/Hr) IV Continuous <Continuous>  heparin   Injectable 5000 Unit(s) SubCutaneous every 8 hours  metoprolol tartrate Injectable 5 milliGRAM(s) IV Push every 6 hours  saline laxative (FLEET) Rectal Enema 1 Enema Rectal at bedtime  tamsulosin 0.4 milliGRAM(s) Oral at bedtime  tiotropium 18 MICROgram(s) Capsule 1 Capsule(s) Inhalation daily    MEDICATIONS  (PRN):  ondansetron Injectable 4 milliGRAM(s) IV Push every 6 hours PRN Nausea      PHYSICAL EXAM:  Un Comfortable, no immediate distress  Eyes: PERRL, EOM intact; conjunctiva and sclera clear  Head: Normocephalic;  No Trauma  ENMT: No nasal discharge, hoarseness, cough or hemoptysis  Neck: Supple; non tender; no masses or deformities.    No JVD  Respiratory: CTA,  - WHEEZING   - RHONCHI  - RALES  - CRACKLES.  Diminished breath sounds  BILATERAL  RIGHT  LEFT bases c more inspiratory splinting   Cardiovascular: Regular rate and rhythm. S1 and S2 Normal; No murmurs, gallops or rubs     - PPM/AICD  Gastrointestinal: Soft obese, mildly tender, +distended; decreased bowel sounds; No hepatosplenomegaly.     -PEG    -  GT   - HYDE  Genitourinary: No costovertebral angle tenderness. No dysuria  Extremities: AROM, No clubbing, cyanosis or edema    Vascular: Peripheral pulses palpable 2+ bilaterally  Neurological: Alert and responisve to stimuli   Skin: Warm and dry. No obvious rash  Lymph Nodes: No acute cervical or supraclavicular adenopathy  Psychiatric: Cooperative and appropriate mood    DEVICES:  - DENTURES   +IV R / L     - ETUBE   -TRACH   -CTUBE  R / L    LABS:                          14.6   9.53  )-----------( 150      ( 09 Dec 2021 07:09 )             45.1     12-09    135  |  104  |  11  ----------------------------<  130<H>  3.3<L>   |  18<L>  |  0.71    Ca    8.6      09 Dec 2021 07:09  Phos  2.6     12-09  Mg     2.1     12-09        RADIOLOGY & ADDITIONAL STUDIES (The following images were personally reviewed):

## 2021-12-09 NOTE — DISCHARGE NOTE PROVIDER - NSDCFUADDINST_GEN_ALL_CORE_FT
General Discharge Instructions:  Please resume all regular home medications unless specifically advised not to take a particular medication. Also, please take any new medications as prescribed.  Please get plenty of rest, continue to ambulate several times per day, and drink adequate amounts of fluids. Avoid lifting weights greater than 5-10 lbs until you follow-up with your surgeon, who will instruct you further regarding activity restrictions.  Avoid driving or operating heavy machinery while taking pain medications.  Please follow-up with your surgeon and Primary Care Provider (PCP) as advised.  Warning Signs:  Please call your doctor or nurse practitioner if you experience the following:  *You experience new chest pain, pressure, squeezing or tightness.  *New or worsening cough, shortness of breath, or wheeze.  *If you are vomiting and cannot keep down fluids or your medications.  *You are getting dehydrated due to continued vomiting, diarrhea, or other reasons. Signs of dehydration include dry mouth, rapid heartbeat, or feeling dizzy or faint when standing.  *You see blood or dark/black material when you vomit or have a bowel movement.  *You experience burning when you urinate, have blood in your urine, or experience a discharge.  *Your pain is not improving within 8-12 hours or is not gone within 24 hours. Call or return immediately if your pain is getting worse, changes location, or moves to your chest or back.  *You have shaking chills, or fever greater than 101.5 degrees Fahrenheit or 38 degrees Celsius.  *Any change in your symptoms, or any new symptoms that concern you.   General Discharge Instructions:  Please resume all regular home medications unless specifically advised not to take a particular medication. Also, please take any new medications as prescribed.  Please get plenty of rest, continue to ambulate several times per day, and drink adequate amounts of fluids. Avoid lifting weights greater than 5-10 lbs until you follow-up with your surgeon, who will instruct you further regarding activity restrictions.  Avoid driving or operating heavy machinery while taking pain medications.  Please follow-up with your surgeon and Primary Care Provider (PCP) as advised.  Warning Signs:  Please call your doctor or nurse practitioner if you experience the following:  *You experience new chest pain, pressure, squeezing or tightness.  *New or worsening cough, shortness of breath, or wheeze.  *If you are vomiting and cannot keep down fluids or your medications.  *You are getting dehydrated due to continued vomiting, diarrhea, or other reasons. Signs of dehydration include dry mouth, rapid heartbeat, or feeling dizzy or faint when standing.  *You see blood or dark/black material when you vomit or have a bowel movement.  *You experience burning when you urinate, have blood in your urine, or experience a discharge.  *Your pain is not improving within 8-12 hours or is not gone within 24 hours. Call or return immediately if your pain is getting worse, changes location, or moves to your chest or back.  *You have shaking chills, or fever greater than 101.5 degrees Fahrenheit or 38 degrees Celsius.  *Any change in your symptoms, or any new symptoms that concern you.    Please continue a full liquid diet until otherwise instructed by Dr. Munoz. You have been prescribed a surgical preparation course of medications as well as a bowel motility stimulant; please take these medications as prescribed.

## 2021-12-09 NOTE — DISCHARGE NOTE PROVIDER - CARE PROVIDER_API CALL
Shonda Munoz)  ColonRectal Surgery; Surgery  [address] [place] [address], [address]  [place] [address]  Phone: [phone]  Fax: [phone]  Follow Up Time: 1 week    Jaron Thornton  Gastroenterology  [address] [place] [address]  Phone: [phone]  Fax: [phone]  Follow Up Time: 1 week   Jaron Thornton  Gastroenterology  [address] [place] [address]  Phone: [phone]  Fax: [phone]  Follow Up Time: 1 week    Iram Engel (MD)  Cardiovascular Disease; Interventional Cardiology  [address] [facility], [facility]  [place] [address]  Phone: [phone]  Fax: [phone]  Follow Up Time:

## 2021-12-09 NOTE — CHART NOTE - NSCHARTNOTEFT_GEN_A_CORE
serial abdominal exam     patient reports no pain.   persistent abdominal distention, nontender, nonperitonitic    continue to monitor closely

## 2021-12-09 NOTE — PROGRESS NOTE ADULT - SUBJECTIVE AND OBJECTIVE BOX
Distended and uncomfortable yesterday afternoon after taking half of Golytely.  Made NPO, and feels better.  No abdominal pain, no more nausea.  Passing small amounts of "bile," no significant flatus.  AFVSS  Abd softer than yesterday afternoon, distended and tympanitic. 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  1. For flex sig +/- stent today.  Risks, alternatives including creation of colostomy to decompress discussed, he wishes to proceed with stenting.  2. clear liquids after flex sig if successful.    3. Plan for resection next week after decompression over next few days.

## 2021-12-09 NOTE — PROGRESS NOTE ADULT - SUBJECTIVE AND OBJECTIVE BOX
76y old  Male who presents with a chief complaint of distal sigmoid obstruction       Seen in AM in Endo Suite  	  MEDICATIONS:  metoprolol tartrate Injectable 5 milliGRAM(s) IV Push every 6 hours  tamsulosin 0.4 milliGRAM(s) Oral at bedtime      ALBUTerol    90 MICROgram(s) HFA Inhaler 1 Puff(s) Inhalation daily  albuterol/ipratropium for Nebulization 3 milliLiter(s) Nebulizer every 6 hours  budesonide  80 MICROgram(s)/formoterol 4.5 MICROgram(s) Inhaler 2 Puff(s) Inhalation daily  tiotropium 18 MICROgram(s) Capsule 1 Capsule(s) Inhalation daily    ondansetron Injectable 4 milliGRAM(s) IV Push every 6 hours PRN    saline laxative (FLEET) Rectal Enema 1 Enema Rectal at bedtime      dextrose 5% + sodium chloride 0.45% 1000 milliLiter(s) IV Continuous <Continuous>  heparin   Injectable 5000 Unit(s) SubCutaneous every 8 hours      Complaint:  feeling much better since procedure    Otherwise 12 point review of systems is normal.    PHYSICAL EXAM:    Constitutional:   Eyes: PERRL, EOMI, sclera non-icteric  Neck: supple, no masses, no JVD  Respiratory: CTA b/l, good air entry b/l, no wheezing, rhonchi, rales,   Cardiovascular: RRR, normal S1S2, no M/R/G  Gastrointestinal: soft, Distended  Extremities:  no c/c/e  Neurological: AAOx3      ICU Vital Signs Last 24 Hrs  T(C): 37 (09 Dec 2021 18:12), Max: 37.6 (08 Dec 2021 21:58)  T(F): 98.6 (09 Dec 2021 18:12), Max: 99.6 (08 Dec 2021 21:58)  HR: 86 (09 Dec 2021 17:42) (85 - 102)  BP: 133/75 (09 Dec 2021 17:42) (125/58 - 163/77)  BP(mean): 100 (09 Dec 2021 14:29) (84 - 111)  ABP: --  ABP(mean): --  RR: 18 (09 Dec 2021 17:42) (17 - 18)  SpO2: 95% (09 Dec 2021 17:42) (95% - 97%)        LABS:	 	  CARDIAC MARKERS:                              14.6   9.53  )-----------( 150      ( 09 Dec 2021 07:09 )             45.1     12-09    135  |  104  |  11  ----------------------------<  130<H>  3.3<L>   |  18<L>  |  0.71    Ca    8.6      09 Dec 2021 07:09  Phos  2.6     12-09  Mg     2.1     12-09            ASSESSMENT/PLAN: 	     S/p flexible sigmoidoscopy w/ stent placement this morning,

## 2021-12-09 NOTE — DISCHARGE NOTE PROVIDER - PROVIDER TOKENS
PROVIDER:[TOKEN:[[id number]],FOLLOWUP:[1 week]],PROVIDER:[TOKEN:[[id number]],FOLLOWUP:[1 week]] PROVIDER:[TOKEN:[[id number]],FOLLOWUP:[1 week]],PROVIDER:[TOKEN:[[id number]]]

## 2021-12-09 NOTE — PROGRESS NOTE ADULT - SUBJECTIVE AND OBJECTIVE BOX
SUBJECTIVE:  Patient examined at bedside by surgery team this AM.  Patient states he feels less "tight" than yesterday, has no complaints. pain is controlled, reports 3 liquid denies N/V,     MEDICATIONS  (STANDING):  ALBUTerol    90 MICROgram(s) HFA Inhaler 1 Puff(s) Inhalation daily  albuterol/ipratropium for Nebulization 3 milliLiter(s) Nebulizer every 6 hours  budesonide  80 MICROgram(s)/formoterol 4.5 MICROgram(s) Inhaler 2 Puff(s) Inhalation daily  dextrose 5% + sodium chloride 0.45% 1000 milliLiter(s) (125 mL/Hr) IV Continuous <Continuous>  heparin   Injectable 5000 Unit(s) SubCutaneous every 8 hours  metoprolol tartrate Injectable 5 milliGRAM(s) IV Push every 6 hours  saline laxative (FLEET) Rectal Enema 1 Enema Rectal at bedtime  tamsulosin 0.4 milliGRAM(s) Oral at bedtime  tiotropium 18 MICROgram(s) Capsule 1 Capsule(s) Inhalation daily    MEDICATIONS  (PRN):  ondansetron Injectable 4 milliGRAM(s) IV Push every 6 hours PRN Nausea      Vital Signs Last 24 Hrs  T(C): 36.8 (09 Dec 2021 04:38), Max: 37.6 (08 Dec 2021 21:58)  T(F): 98.2 (09 Dec 2021 04:38), Max: 99.6 (08 Dec 2021 21:58)  HR: 102 (09 Dec 2021 04:30) (88 - 102)  BP: 163/77 (09 Dec 2021 04:30) (125/60 - 163/77)  BP(mean): 111 (09 Dec 2021 04:30) (84 - 111)  RR: 17 (09 Dec 2021 04:30) (17 - 18)  SpO2: 97% (09 Dec 2021 04:30) (91% - 97%)    PHYSICAL EXAM:    Constitutional: A&Ox3    Respiratory: non labored breathing, no respiratory distress    Cardiovascular: NSR, RRR    Gastrointestinal:                 Incision:    Genitourinary:    Extremities: (-) edema                  I&O's Detail    08 Dec 2021 07:01  -  09 Dec 2021 07:00  --------------------------------------------------------  IN:    dextrose 5% + sodium chloride 0.45% w/ Additives: 2415 mL    Oral Fluid: 600 mL  Total IN: 3015 mL    OUT:    Voided (mL): 1000 mL  Total OUT: 1000 mL    Total NET: 2015 mL          LABS:                        14.6   9.53  )-----------( 150      ( 09 Dec 2021 07:09 )             45.1     12-09    135  |  104  |  11  ----------------------------<  130<H>  3.3<L>   |  18<L>  |  0.71    Ca    8.6      09 Dec 2021 07:09  Phos  2.6     12-09  Mg     2.1     12-09            RADIOLOGY & ADDITIONAL STUDIES: SUBJECTIVE:  Patient examined at bedside by surgery team this AM.  Patient states he feels less "tight" than yesterday, has no complaints. pain is controlled, reports 3 liquid BMs O/N, denies flatus. denies N/V, CP, SOB, fever, chills.    MEDICATIONS  (STANDING):  ALBUTerol    90 MICROgram(s) HFA Inhaler 1 Puff(s) Inhalation daily  albuterol/ipratropium for Nebulization 3 milliLiter(s) Nebulizer every 6 hours  budesonide  80 MICROgram(s)/formoterol 4.5 MICROgram(s) Inhaler 2 Puff(s) Inhalation daily  dextrose 5% + sodium chloride 0.45% 1000 milliLiter(s) (125 mL/Hr) IV Continuous <Continuous>  heparin   Injectable 5000 Unit(s) SubCutaneous every 8 hours  metoprolol tartrate Injectable 5 milliGRAM(s) IV Push every 6 hours  saline laxative (FLEET) Rectal Enema 1 Enema Rectal at bedtime  tamsulosin 0.4 milliGRAM(s) Oral at bedtime  tiotropium 18 MICROgram(s) Capsule 1 Capsule(s) Inhalation daily    MEDICATIONS  (PRN):  ondansetron Injectable 4 milliGRAM(s) IV Push every 6 hours PRN Nausea      Vital Signs Last 24 Hrs  T(C): 36.8 (09 Dec 2021 04:38), Max: 37.6 (08 Dec 2021 21:58)  T(F): 98.2 (09 Dec 2021 04:38), Max: 99.6 (08 Dec 2021 21:58)  HR: 102 (09 Dec 2021 04:30) (88 - 102)  BP: 163/77 (09 Dec 2021 04:30) (125/60 - 163/77)  BP(mean): 111 (09 Dec 2021 04:30) (84 - 111)  RR: 17 (09 Dec 2021 04:30) (17 - 18)  SpO2: 97% (09 Dec 2021 04:30) (91% - 97%)    PHYSICAL EXAM:    Constitutional: A&Ox3    Respiratory: non labored breathing, no respiratory distress    Cardiovascular: NSR, RRR    Gastrointestinal: non-tender, softer than previous exam, distended and tympanic    Genitourinary: voiding    Extremities: (-) edema, -calf tenderness                  I&O's Detail    08 Dec 2021 07:01  -  09 Dec 2021 07:00  --------------------------------------------------------  IN:    dextrose 5% + sodium chloride 0.45% w/ Additives: 2415 mL    Oral Fluid: 600 mL  Total IN: 3015 mL    OUT:    Voided (mL): 1000 mL  Total OUT: 1000 mL    Total NET: 2015 mL          LABS:                        14.6   9.53  )-----------( 150      ( 09 Dec 2021 07:09 )             45.1     12-09    135  |  104  |  11  ----------------------------<  130<H>  3.3<L>   |  18<L>  |  0.71    Ca    8.6      09 Dec 2021 07:09  Phos  2.6     12-09  Mg     2.1     12-09

## 2021-12-09 NOTE — DISCHARGE NOTE PROVIDER - NSDCACTIVITY_GEN_ALL_CORE
No restrictions/Return to Work/School allowed/Sex allowed/Showering allowed/Stairs allowed/Walking - Indoors allowed/No heavy lifting/straining/Walking - Outdoors allowed/Follow Instructions Provided by your Surgical Team

## 2021-12-09 NOTE — CHART NOTE - NSCHARTNOTEFT_GEN_A_CORE
SERIAL ABDOMINAL EXAM    Pt seen and examined at bedside. S/p flexible sigmoidoscopy w/ stent placement this morning, currently reports feeling much better since procedure. Advanced to clears post procedure and overall tolerating without nausea or emesis. Reports feeling less distended. +multiple BMs/+flatus. Ambulating without difficulty. Denies F, N, V, CP, SOB, or abdominal pain. Pt is HD stable.    ICU Vital Signs Last 24 Hrs  T(C): 37.4 (09 Dec 2021 13:47), Max: 37.6 (08 Dec 2021 21:58)  T(F): 99.4 (09 Dec 2021 13:47), Max: 99.6 (08 Dec 2021 21:58)  HR: 86 (09 Dec 2021 14:29) (85 - 102)  BP: 144/75 (09 Dec 2021 14:29) (125/58 - 163/77)  BP(mean): 100 (09 Dec 2021 14:29) (84 - 111)  ABP: --  ABP(mean): --  RR: 18 (09 Dec 2021 14:29) (17 - 18)  SpO2: 95% (09 Dec 2021 14:29) (91% - 97%)    Physical Exam:     Constitutional: A&Ox3, NAD  Respiratory: non labored breathing, no respiratory distress  Cardiovascular: NSR, RRR  Gastrointestinal: abdomen soft and obese, distended but significantly improved, nt. No rebound or guarding. +tympanic   Genitourinary: voiding  Extremities: wwp, no calf tenderness or edema, SCDs in place    A/p: 77 yo M PMH of asthma, HTN, presents with abdominal distension and not being able to pass a decent bowel movements or gas for 3-4 days. Admitted for CT findings of large bowel obstruction, secondary to an annular/apple core lesion within the distal sigmoid colon which is suspicious for neoplasm-colonic adenocarcinoma now s/p flex sig, stricture noted and colon cleaned, no stent placed on 12/7. Pt developed worsening symptoms of obstruction, now s/p flex sig w/ successful placement on 12/10. Now w/ adequate bowel function, HD stable.     Pain/nausea control prn  CLD/IVF  Continue serial abd exams   HSQ/SCDs  OOBA/IS  Metoprolol IV   Dispo planning

## 2021-12-10 ENCOUNTER — TRANSCRIPTION ENCOUNTER (OUTPATIENT)
Age: 76
End: 2021-12-10

## 2021-12-10 VITALS — TEMPERATURE: 98 F

## 2021-12-10 LAB
ANION GAP SERPL CALC-SCNC: 13 MMOL/L — SIGNIFICANT CHANGE UP (ref 5–17)
BUN SERPL-MCNC: 8 MG/DL — SIGNIFICANT CHANGE UP (ref 7–23)
CALCIUM SERPL-MCNC: 8.8 MG/DL — SIGNIFICANT CHANGE UP (ref 8.4–10.5)
CHLORIDE SERPL-SCNC: 106 MMOL/L — SIGNIFICANT CHANGE UP (ref 96–108)
CO2 SERPL-SCNC: 20 MMOL/L — LOW (ref 22–31)
CREAT SERPL-MCNC: 0.74 MG/DL — SIGNIFICANT CHANGE UP (ref 0.5–1.3)
GLUCOSE SERPL-MCNC: 123 MG/DL — HIGH (ref 70–99)
HCT VFR BLD CALC: 42.8 % — SIGNIFICANT CHANGE UP (ref 39–50)
HGB BLD-MCNC: 14.2 G/DL — SIGNIFICANT CHANGE UP (ref 13–17)
MAGNESIUM SERPL-MCNC: 2 MG/DL — SIGNIFICANT CHANGE UP (ref 1.6–2.6)
MCHC RBC-ENTMCNC: 27.8 PG — SIGNIFICANT CHANGE UP (ref 27–34)
MCHC RBC-ENTMCNC: 33.2 GM/DL — SIGNIFICANT CHANGE UP (ref 32–36)
MCV RBC AUTO: 83.9 FL — SIGNIFICANT CHANGE UP (ref 80–100)
NRBC # BLD: 0 /100 WBCS — SIGNIFICANT CHANGE UP (ref 0–0)
PHOSPHATE SERPL-MCNC: 2.6 MG/DL — SIGNIFICANT CHANGE UP (ref 2.5–4.5)
PLATELET # BLD AUTO: 214 K/UL — SIGNIFICANT CHANGE UP (ref 150–400)
POTASSIUM SERPL-MCNC: 3.5 MMOL/L — SIGNIFICANT CHANGE UP (ref 3.5–5.3)
POTASSIUM SERPL-SCNC: 3.5 MMOL/L — SIGNIFICANT CHANGE UP (ref 3.5–5.3)
RBC # BLD: 5.1 M/UL — SIGNIFICANT CHANGE UP (ref 4.2–5.8)
RBC # FLD: 14.5 % — SIGNIFICANT CHANGE UP (ref 10.3–14.5)
SODIUM SERPL-SCNC: 139 MMOL/L — SIGNIFICANT CHANGE UP (ref 135–145)
WBC # BLD: 8.73 K/UL — SIGNIFICANT CHANGE UP (ref 3.8–10.5)
WBC # FLD AUTO: 8.73 K/UL — SIGNIFICANT CHANGE UP (ref 3.8–10.5)

## 2021-12-10 PROCEDURE — 99232 SBSQ HOSP IP/OBS MODERATE 35: CPT

## 2021-12-10 RX ORDER — ALBUTEROL 90 UG/1
0 AEROSOL, METERED ORAL
Qty: 0 | Refills: 0 | DISCHARGE

## 2021-12-10 RX ORDER — ALBUTEROL 90 UG/1
1 AEROSOL, METERED ORAL
Qty: 0 | Refills: 0 | DISCHARGE
Start: 2021-12-10

## 2021-12-10 RX ORDER — POTASSIUM CHLORIDE 20 MEQ
40 PACKET (EA) ORAL ONCE
Refills: 0 | Status: DISCONTINUED | OUTPATIENT
Start: 2021-12-10 | End: 2021-12-10

## 2021-12-10 RX ORDER — METOPROLOL TARTRATE 50 MG
1 TABLET ORAL
Qty: 60 | Refills: 0
Start: 2021-12-10 | End: 2022-01-08

## 2021-12-10 RX ADMIN — Medication 3 MILLILITER(S): at 06:43

## 2021-12-10 RX ADMIN — Medication 3 MILLILITER(S): at 00:02

## 2021-12-10 RX ADMIN — Medication 5 MILLIGRAM(S): at 00:01

## 2021-12-10 RX ADMIN — HEPARIN SODIUM 5000 UNIT(S): 5000 INJECTION INTRAVENOUS; SUBCUTANEOUS at 05:27

## 2021-12-10 RX ADMIN — Medication 5 MILLIGRAM(S): at 05:27

## 2021-12-10 NOTE — PROGRESS NOTE ADULT - SUBJECTIVE AND OBJECTIVE BOX
INTERVAL HPI/OVERNIGHT EVENTS: +bm/f, -n/v, skipped pm enema, antonio, vss    STATUS POST:  12/7: flex sig, stricture noted and colon cleaned, no stent placed.    POST OPERATIVE DAY #: 3    SUBJECTIVE: Pt seen and examined at bedside this am by surgery team. Tolerating diet, pain well controlled. +F/+BM. Denies f/n/v/cp/sob.    MEDICATIONS  (STANDING):  ALBUTerol    90 MICROgram(s) HFA Inhaler 1 Puff(s) Inhalation daily  albuterol/ipratropium for Nebulization 3 milliLiter(s) Nebulizer every 6 hours  budesonide  80 MICROgram(s)/formoterol 4.5 MICROgram(s) Inhaler 2 Puff(s) Inhalation daily  dextrose 5% + sodium chloride 0.45% 1000 milliLiter(s) (125 mL/Hr) IV Continuous <Continuous>  heparin   Injectable 5000 Unit(s) SubCutaneous every 8 hours  metoprolol tartrate Injectable 5 milliGRAM(s) IV Push every 6 hours  potassium chloride   Powder 40 milliEquivalent(s) Oral once  saline laxative (FLEET) Rectal Enema 1 Enema Rectal at bedtime  tamsulosin 0.4 milliGRAM(s) Oral at bedtime  tiotropium 18 MICROgram(s) Capsule 1 Capsule(s) Inhalation daily    MEDICATIONS  (PRN):  ondansetron Injectable 4 milliGRAM(s) IV Push every 6 hours PRN Nausea    Vital Signs Last 24 Hrs  T(C): 36.6 (10 Dec 2021 05:26), Max: 37.4 (09 Dec 2021 13:47)  T(F): 97.9 (10 Dec 2021 05:26), Max: 99.4 (09 Dec 2021 13:47)  HR: 96 (10 Dec 2021 08:30) (86 - 100)  BP: 146/70 (10 Dec 2021 08:30) (125/58 - 158/82)  BP(mean): 100 (10 Dec 2021 08:30) (84 - 113)  RR: 18 (10 Dec 2021 08:30) (18 - 18)  SpO2: 94% (10 Dec 2021 08:30) (91% - 95%)    PHYSICAL EXAM:    Constitutional: A&Ox3, NAD    Respiratory: non labored breathing, no respiratory distress    Cardiovascular: NSR, RRR    Gastrointestinal: abdomen soft and obese, mildly distended (significantly improved), nt. No rebound or guarding.     Extremities: wwp, no calf tenderness or edema. SCDs in place     I&O's Detail    09 Dec 2021 07:01  -  10 Dec 2021 07:00  --------------------------------------------------------  IN:    dextrose 5% + sodium chloride 0.45% w/ Additives: 1375 mL  Total IN: 1375 mL    OUT:    Voided (mL): 1400 mL  Total OUT: 1400 mL    Total NET: -25 mL          LABS:                        14.2   8.73  )-----------( 214      ( 10 Dec 2021 06:51 )             42.8     12-10    139  |  106  |  8   ----------------------------<  123<H>  3.5   |  20<L>  |  0.74    Ca    8.8      10 Dec 2021 06:51  Phos  2.6     12-10  Mg     2.0     12-10            RADIOLOGY & ADDITIONAL STUDIES:

## 2021-12-10 NOTE — PROGRESS NOTE ADULT - SUBJECTIVE AND OBJECTIVE BOX
77 yo M PMH of asthma, HTN presented for abdominal distension, not being able to pass a decent bowel movement or gas in 3-4 days. He also endorsed abdominal distension, discomfort and diffuse pain. He denied nausea or vomiting but been hiccuping. He denied any personal or family hx of colon cancer or any cancer for that matter. His last Cscope was 2 years ago and reportedly was normal. Off note, he denied any prior abdominal surgeries.     In the ED, he was tachycardiac to 115 and systolic 180 mmHg as he skipped his home BP medications. His labs are unremarkable. CT showed distal sigmoid obstruction/apple core lesion. Maximum cecal diameter is 11cm.       Patient History:   Past Medical, Past Surgical, and Family History:  PAST MEDICAL HISTORY:  Asthma     History of BPH     Hypertension.  	  MEDICATIONS:  metoprolol tartrate Injectable 5 milliGRAM(s) IV Push every 6 hours  tamsulosin 0.4 milliGRAM(s) Oral at bedtime      ALBUTerol    90 MICROgram(s) HFA Inhaler 1 Puff(s) Inhalation daily  albuterol/ipratropium for Nebulization 3 milliLiter(s) Nebulizer every 6 hours  budesonide  80 MICROgram(s)/formoterol 4.5 MICROgram(s) Inhaler 2 Puff(s) Inhalation daily  tiotropium 18 MICROgram(s) Capsule 1 Capsule(s) Inhalation daily    ondansetron Injectable 4 milliGRAM(s) IV Push every 6 hours PRN    saline laxative (FLEET) Rectal Enema 1 Enema Rectal at bedtime      dextrose 5% + sodium chloride 0.45% 1000 milliLiter(s) IV Continuous <Continuous>  heparin   Injectable 5000 Unit(s) SubCutaneous every 8 hours  potassium chloride   Powder 40 milliEquivalent(s) Oral once      Complaint:     Otherwise 12 point review of systems is normal.    PHYSICAL EXAM:    ICU Vital Signs Last 24 Hrs  T(C): 37 (10 Dec 2021 10:00), Max: 37.3 (09 Dec 2021 22:32)  T(F): 98.6 (10 Dec 2021 10:00), Max: 99.2 (09 Dec 2021 22:32)  HR: 96 (10 Dec 2021 08:30) (86 - 100)  BP: 146/70 (10 Dec 2021 08:30) (125/76 - 158/82)  BP(mean): 100 (10 Dec 2021 08:30) (90 - 113)  ABP: --  ABP(mean): --  RR: 18 (10 Dec 2021 08:30) (18 - 18)  SpO2: 94% (10 Dec 2021 08:30) (91% - 95%)        LABS:	 	  CARDIAC MARKERS:                              14.2   8.73  )-----------( 214      ( 10 Dec 2021 06:51 )             42.8     12-10    139  |  106  |  8   ----------------------------<  123<H>  3.5   |  20<L>  |  0.74    Ca    8.8      10 Dec 2021 06:51  Phos  2.6     12-10  Mg     2.0     12-10            ASSESSMENT/PLAN: 	    77 yo M PMH of asthma, HTN presented on day of admission for abdominal distension, not being able to pass a decent bowel movement or gas in 3-4 days. He also endorsed abdominal distension, discomfort and diffuse pain. He denied nausea or vomiting but been hiccuping. He denied any personal or family hx of colon cancer or any cancer for that matter. His last Cscope was 2 years ago and reportedly was normal. Off note, he denied any prior abdominal surgeries. Admitted for CT findings of large bowel obstruction, secondary to an annular/apple core lesion within the distal sigmoid colon which is suspicious for neoplasm-colonic adenocarcinoma. Patient underwent flex sig on 12/7,  which found stricture noted and colon cleaned, no stent placed; cardiology also consulted for comanagement for preoperative clearance. On 12/8 patient was re-prepped for scope after distendion when having PO intake. 12/9 patient underwent sigmoidoscopy and a stent was placed. The remainder of his hospital course was unremarkable with advancement of diet, passing trial of void, and pain control. On day of discharge patient was stable to be d/c'd home after he began passing consistent bowel movements.

## 2021-12-10 NOTE — DISCHARGE NOTE NURSING/CASE MANAGEMENT/SOCIAL WORK - NSDCPEFALRISK_GEN_ALL_CORE
For information on Fall & Injury Prevention, visit: [email or web address]  [email or web address] OR  https://www.cdc.gov/steadi/patient.html

## 2021-12-10 NOTE — DISCHARGE NOTE NURSING/CASE MANAGEMENT/SOCIAL WORK - PATIENT PORTAL LINK FT
You can access the FollowMyHealth Patient Portal offered by [facility] by registering at the following website: [email or web address]. By joining [facility]’s FollowMyHealth portal, you will also be able to view your health information using other applications (apps) compatible with our system.

## 2021-12-10 NOTE — PROGRESS NOTE ADULT - SUBJECTIVE AND OBJECTIVE BOX
Pt seen and examined at bedside.  No acute event overnight and no new complaint.  Reports continue flatus and BM yesterday.  Was worried about poor control of BM at this time following stent placement.  Denies fever, chill, chest pain, shortness of breath, abdominal or epigastric pain, nausea, vomiting, hematemesis,melena, or hematochezia.     Allergies    No Known Allergies    Intolerances      MEDICATIONS:  MEDICATIONS  (STANDING):  ALBUTerol    90 MICROgram(s) HFA Inhaler 1 Puff(s) Inhalation daily  albuterol/ipratropium for Nebulization 3 milliLiter(s) Nebulizer every 6 hours  budesonide  80 MICROgram(s)/formoterol 4.5 MICROgram(s) Inhaler 2 Puff(s) Inhalation daily  dextrose 5% + sodium chloride 0.45% 1000 milliLiter(s) (125 mL/Hr) IV Continuous <Continuous>  heparin   Injectable 5000 Unit(s) SubCutaneous every 8 hours  metoprolol tartrate Injectable 5 milliGRAM(s) IV Push every 6 hours  potassium chloride   Powder 40 milliEquivalent(s) Oral once  saline laxative (FLEET) Rectal Enema 1 Enema Rectal at bedtime  tamsulosin 0.4 milliGRAM(s) Oral at bedtime  tiotropium 18 MICROgram(s) Capsule 1 Capsule(s) Inhalation daily    MEDICATIONS  (PRN):  ondansetron Injectable 4 milliGRAM(s) IV Push every 6 hours PRN Nausea    Vital Signs Last 24 Hrs  T(C): 36.6 (10 Dec 2021 05:26), Max: 37.4 (09 Dec 2021 13:47)  T(F): 97.9 (10 Dec 2021 05:26), Max: 99.4 (09 Dec 2021 13:47)  HR: 96 (10 Dec 2021 08:30) (86 - 100)  BP: 146/70 (10 Dec 2021 08:30) (125/76 - 158/82)  BP(mean): 100 (10 Dec 2021 08:30) (90 - 113)  RR: 18 (10 Dec 2021 08:30) (18 - 18)  SpO2: 94% (10 Dec 2021 08:30) (91% - 95%)    12-09 @ 07:01  -  12-10 @ 07:00  --------------------------------------------------------  IN: 1375 mL / OUT: 1400 mL / NET: -25 mL      PHYSICAL EXAM:    General: Laying comfortably in bed, in no acute distress  HEENT: MMM, conjunctiva and sclera clear  Gastrointestinal: Soft, non-tender, distended, but improved from before, No rebound or guarding  Skin: Warm and dry    LABS:                        14.2   8.73  )-----------( 214      ( 10 Dec 2021 06:51 )             42.8     12-10    139  |  106  |  8   ----------------------------<  123<H>  3.5   |  20<L>  |  0.74    Ca    8.8      10 Dec 2021 06:51  Phos  2.6     12-10  Mg     2.0     12-10    RADIOLOGY & ADDITIONAL STUDIES: reviewed

## 2021-12-10 NOTE — DISCHARGE NOTE NURSING/CASE MANAGEMENT/SOCIAL WORK - NSDCFUADDAPPT_GEN_ALL_CORE_FT
1. Please take metoprolol 50mg twice daily as prescribed per Dr. Engel; please follow-up with Dr. Engel in the Cardiology clinic in 1-2 weeks regarding continuation of your metoprolol.

## 2021-12-10 NOTE — PROGRESS NOTE ADULT - REASON FOR ADMISSION
distal sigmoid obstruction

## 2021-12-10 NOTE — PROGRESS NOTE ADULT - SUBJECTIVE AND OBJECTIVE BOX
Interventional Cardiology Adult Progress Note    Subjective Assessment:  No chest pain or dyspnea  	  MEDICATIONS:  metoprolol tartrate Injectable 5 milliGRAM(s) IV Push every 6 hours  tamsulosin 0.4 milliGRAM(s) Oral at bedtime      ALBUTerol    90 MICROgram(s) HFA Inhaler 1 Puff(s) Inhalation daily  albuterol/ipratropium for Nebulization 3 milliLiter(s) Nebulizer every 6 hours  budesonide  80 MICROgram(s)/formoterol 4.5 MICROgram(s) Inhaler 2 Puff(s) Inhalation daily  tiotropium 18 MICROgram(s) Capsule 1 Capsule(s) Inhalation daily    ondansetron Injectable 4 milliGRAM(s) IV Push every 6 hours PRN    saline laxative (FLEET) Rectal Enema 1 Enema Rectal at bedtime      dextrose 5% + sodium chloride 0.45% 1000 milliLiter(s) IV Continuous <Continuous>  heparin   Injectable 5000 Unit(s) SubCutaneous every 8 hours  potassium chloride   Powder 40 milliEquivalent(s) Oral once      	    [PHYSICAL EXAM:  TELEMETRY:  T(C): 37 (12-10-21 @ 10:00), Max: 37.3 (12-09-21 @ 22:32)  HR: 96 (12-10-21 @ 08:30) (92 - 100)  BP: 146/70 (12-10-21 @ 08:30) (126/77 - 158/82)  RR: 18 (12-10-21 @ 08:30) (18 - 18)  SpO2: 94% (12-10-21 @ 08:30) (91% - 94%)  Wt(kg): --  I&O's Summary    09 Dec 2021 07:01  -  10 Dec 2021 07:00  --------------------------------------------------------  IN: 1375 mL / OUT: 1400 mL / NET: -25 mL    10 Dec 2021 07:01  -  10 Dec 2021 22:20  --------------------------------------------------------  IN: 855 mL / OUT: 250 mL / NET: 605 mL        [place]:  Central/PICC/Mid Line:                                         Appearance: Normal	  HEENT:   Normal oral mucosa, PERRL, EOMI	  Neck: Supple,  - JVD; Carotid Bruit   Cardiovascular: Normal S1 S2, No JVD, No murmurs,   Respiratory: Lungs clear to auscultation/Decreased Breath Sounds/No Rales, Rhonchi, Wheezing	  Psychiatry: A & O x 3, Mood & affect appropriate      	    ECG:  	NSR, ST  RADIOLOGY:   DIAGNOSTIC TESTING:  [ ] Echocardiogram:  [ ]  Catheterization:  [ ] Stress Test:    [ ] FREDY  OTHER: 	    LABS:	 	  CARDIAC MARKERS:                                  14.2   8.73  )-----------( 214      ( 10 Dec 2021 06:51 )             42.8     12-10    139  |  106  |  8   ----------------------------<  123<H>  3.5   |  20<L>  |  0.74    Ca    8.8      10 Dec 2021 06:51  Phos  2.6     12-10  Mg     2.0     12-10      proBNP:   Lipid Profile:   HgA1c:   TSH:

## 2021-12-10 NOTE — PROGRESS NOTE ADULT - ASSESSMENT
75 yo M PMH of asthma, HTN presented for abdominal distension and constipation found to have large bowel obstruction 2/2 benign sigmoid stricture s/p stent placement.    #Large Bowel Obstruction 2/2 benign sigmoid short stricture  Patient with 1 week of obstipation now s/p flex sig notable for benign appearing sigmoid stricture, nonobstructive.  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  Decompression of the colon was performed.  He was started on bowel prep but had limited output.  He went for flex sig on 12/9/21 and had 90 mm uncovered metal colonic stent placed across the stricture.    -Surgical management per primary team    GI will sign off, please reconsult as needed    Recommendations d/w primary team  Case d/w svc attg  
77 yo M PMH of asthma, HTN admitted for large bowel obstruction  Pre op cardiac clearance.  No past cardiac history  No active cardiac symptoms i.e. no chest pain or dyspnea at rest or in the recent outpatient setting. 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  echo shows normal LV systolic function with basal inferior wall hypo; no hemodynamic significant valve disease  Keep metoprolol po and up the dose due to extrasystoles (couplets) seen on monitor. (increase to 25mg q6)  Transition to Metoprolol IV for shelley- and post op beta-blockers with holding parameters 5mg IV q6 once NPO  Given the above and integrating surgical risks and benefits, patient is cleared for his abdominal surgery from cardiac standpoint.   
77 yo M PMH of asthma, HTN, presents with abdominal distension and not being able to pass a decent bowel movements or gas for 3-4 days. Admitted for CT findings of large bowel obstruction, secondary to an annular/apple core lesion within the distal sigmoid colon which is suspicious for neoplasm-colonic adenocarcinoma now s/p flex sig, stricture noted and colon cleaned, no stent placed on 12/7. Pt is now s/p repeat flex sig w/ successful stent placement on 12/9. HD stable, having adequate bowel function.     Pain/nausea control prn  FLD/IVF  HSQ/SCDs  OOBA/IS  Metoprolol IV   AM labs appreciated  D/c home today on FLD w/ plan for elective surgery next week.
77 yo M PMH of asthma, HTN, presents with abdominal distension and not being able to pass a decent bowel movements or gas for 3-4 days. Admitted for CT findings of large bowel obstruction, secondary to an annular/apple core lesion within the distal sigmoid colon which is suspicious for neoplasm-colonic adenocarcinoma.     GI consulted and will be doing colonoscopy this morning.  If cannot stent, will likely go to the OR for resection  NPO/IVF LR @ 140cc/hr  pain/nausea control  SQH for ppx  SCDs, ALEXIS, IS  AM labs
75 yo M PMH of asthma, HTN admitted for large bowel obstruction  Pre op cardiac clearance.  No past cardiac history  No active cardiac symptoms i.e. no chest pain or dyspnea at rest or in the recent outpatient setting. 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  echo shows normal LV systolic function with basal inferior wall hypo; no hemodynamic significant valve disease  Keep metoprolol po and up the dose due to extrasystoles (couplets) seen on monitor. (increase to 25mg q6)  Transition to Metoprolol IV for shelley- and post op beta-blockers with holding parameters 5mg IV q6 once NPO  Given the above and integrating surgical risks and benefits, patient is cleared for his abdominal surgery from cardiac standpoint.   
75 yo M PMH of asthma, HTN presented for abdominal distension and constipation found to have large bowel obstruction s/p endoscopic decompression 12/7/21    #Large Bowel Obstruction   Patient p/w 1 week of obstipation s/p flex sig notable for benign appearing sigmoid stricture, nonobstructive.  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  Decompression of the colon was performed.  He was started on bowel prep to prepare for colectomy but again without stool output.    -NPO  -Tentative plan for flex sig tomorrow am    Recommendations d/w primary team  Case d/w svc attg
77 yo M PMH of asthma, HTN, presents with abdominal distension and not being able to pass a decent bowel movements or gas for 3-4 days. Admitted for CT findings of large bowel obstruction, secondary to an annular/apple core lesion within the distal sigmoid colon which is suspicious for neoplasm-colonic adenocarcinoma now s/p flex sig, stricture noted and colon cleaned, no stent placed on 12/7    CLD  pain/nausea control  SQH for ppx  SCDs, OOBA, IS  PO metoprolol  AM labs  
SSESSMENT/PLAN76 yo M PMH of controlled asthma, HTN presented for abdominal distension and constipation found to have large bowel obstruction. hypo K    1. O2 attempt off   2. Bronchodilators:  Atrovent/ albuterol q 4 – 6 hours as needed +Spiriva   3. Corticosteroids: off systemic , on Symbicort  4. ID/Antibiotics: as per surgery   5. Cardiac/HTN: optimize BP  6. GI: Rx/ prophylaxis c PPI/H2B  7. Heme: Rx/VT prophylaxis c SQH/SCD/  8. Aspiration precautions at all times  9. Progress incentive spirometry  10. Replete K as needed  Discussed with managing team,    The patient appears at an acceptable pulmonary risk for planned  abdominal surgery if indicated .
ASSESSMENT/PLAN76 yo M PMH of controlled asthma, HTN presented for abdominal distension and constipation found to have large bowel obstruction. hypo K    1. O2 attempt off   2. Bronchodilators:  Atrovent/ albuterol q 4 – 6 hours as needed  3. Corticosteroids: off systemic   4. ID/Antibiotics: as per surgery   5. Cardiac/HTN: optimize BP  6. GI: Rx/ prophylaxis c PPI/H2B  7. Heme: Rx/VT prophylaxis c SQH/SCD/  8. Aspiration precautions at all times  9. Start incentive spirometry  10. replete K as needed  Discussed with managing team,    The patient appears at an acceptable pulmonary risk for planned  abdominal surgery.       
SSESSMENT/PLAN76 yo M PMH of controlled asthma, HTN presented for abdominal distension and constipation found to have large bowel obstruction. hypo K    1. O2 attempt off   2. Bronchodilators:  Atrovent/ albuterol q 4 – 6 hours as needed  3. Corticosteroids: off systemic   4. ID/Antibiotics: as per surgery   5. Cardiac/HTN: optimize BP  6. GI: Rx/ prophylaxis c PPI/H2B  7. Heme: Rx/VT prophylaxis c SQH/SCD/  8. Aspiration precautions at all times  9. Start incentive spirometry  10. replete K as needed  Discussed with managing team,    The patient appears at an acceptable pulmonary risk for planned  abdominal surgery f indicated .   
SSESSMENT/PLAN76 yo M PMH of controlled asthma, HTN presented for abdominal distension and constipation found to have large bowel obstruction. hypo K    1. O2 attempt off   2. Bronchodilators:  Atrovent/ albuterol q 4 – 6 hours as needed +Spiriva   3. Corticosteroids: off systemic , on Symbicort  4. ID/Antibiotics: as per surgery   5. Cardiac/HTN: optimize BP  6. GI: Rx/ prophylaxis c PPI/H2B  7. Heme: Rx/VT prophylaxis c SQH/SCD/  8. Aspiration precautions at all times  9. Progress incentive spirometry  10. Replete K as needed  Discussed with managing team,    The patient appears at an acceptable pulmonary risk for planned  abdominal surgery if indicated .   
75 yo M PMH of asthma, HTN, presents with abdominal distension and not being able to pass a decent bowel movements or gas for 3-4 days. Admitted for CT findings of large bowel obstruction, secondary to an annular/apple core lesion within the distal sigmoid colon which is suspicious for neoplasm-colonic adenocarcinoma now s/p flex sig, stricture noted and colon cleaned, no stent placed on 12/7    GI- flex sig 12/9  Pain/nausea control prn  NPO/IVF  Serial abd exams   HSQ/SCDs  OOBA/IS  Metoprolol IV   AM labs

## 2021-12-10 NOTE — PROGRESS NOTE ADULT - SUBJECTIVE AND OBJECTIVE BOX
Interventional, Pulmonary, Critical, Chest Special Procedures.    Pt was seen and fully examined by myself.     Time spent with patient in minutes:37    Patient is a 76y old  Male who presents with a chief complaint of distal sigmoid obstruction (10 Dec 2021 12:05) The patient reports feeling better today, eupneic now on RA, passing flatus, no dysuria, pain free when seen    HPI:  77 yo M PMH of asthma, HTN presented for abdominal distension, not being able to pass a decent bowel movement or gas in 3-4 days. He also endorsed abdominal distension, discomfort and diffuse pain. He denied nausea or vomiting but been hiccuping. He denied any personal or family hx of colon cancer or any cancer for that matter. His last Cscope was 2 years ago and reportedly was normal. Off note, he denied any prior abdominal surgeries.     In the ED, he was tachycardiac to 115 and systolic 180 mmHg as he skipped his home BP medications. His labs are unremarkable. CT showed distal sigmoid obstruction/apple core lesion. Maximum cecal diameter is 11cm.  (06 Dec 2021 20:53)      REVIEW OF SYSTEMS:  Constitutional: No fever, weight loss, chills +++fatigue  Eyes: No eye pain, visual disturbances, or discharge  ENMT:  No difficulty hearing, tinnitus, vertigo; No sinus or throat pain. No epistaxis, dysphagia, dysphonia, hoarseness or odynophagia  Neck: No pain, stiffness or neck swelling.  No masses or deformities  Respiratory: chronic cough, no wheezing, hemoptysis  - COPD  - ILD   - PE   + ASTHMA     - PNEUMONIA  Cardiovascular: No chest pain, dysrhythmia, palpitations, dizziness or edema - CAD   - CHF   + HTN  Gastrointestinal: + abdominal or epigastric pain. + nausea, no vomiting or hematemesis; No diarrhea or constipation. No melena or hematochezia, Icterus.          Genitourinary: No dysuria, frequency, hematuria or incontinence   - CKD/ROSS      - ESRD  Neurological: No headaches, memory loss, loss of strength, numbness or tremors      -DEMENTIA     - STROKE    - SEIZURE  Skin: No itching, burning, rashes or lesions   Lymph Nodes: No enlarged glands  Endocrine: No heat or cold intolerance; No hair loss       - DM     - THYROID DISORDER  Musculoskeletal: No joint pain or swelling; No muscle, back or extremity pain, No edema  Psychiatric: No depression, anxiety, mood swings or difficulty sleeping  Heme/Lymph: No easy bruising or bleeding gums         - ANEMIA      - CANCER   -COAGULOPATHY  Allergy and Immunologic: No hives or eczema  PAST MEDICAL & SURGICAL HISTORY:  Asthma    Hypertension    History of BPH      FAMILY HISTORY:    SOCIAL HISTORY:      - Tobacco     - ETOH    Allergies    No Known Allergies    Intolerances      Vital Signs Last 24 Hrs  T(C): 37 (10 Dec 2021 10:00), Max: 37.3 (09 Dec 2021 22:32)  T(F): 98.6 (10 Dec 2021 10:00), Max: 99.2 (09 Dec 2021 22:32)  HR: 96 (10 Dec 2021 08:30) (86 - 100)  BP: 146/70 (10 Dec 2021 08:30) (125/76 - 158/82)  BP(mean): 100 (10 Dec 2021 08:30) (90 - 113)  RR: 18 (10 Dec 2021 08:30) (18 - 18)  SpO2: 94% (10 Dec 2021 08:30) (91% - 95%)    12-09 @ 07:01  -  12-10 @ 07:00  --------------------------------------------------------  IN: 1375 mL / OUT: 1400 mL / NET: -25 mL    12-10 @ 07:01  -  12-10 @ 14:16  --------------------------------------------------------  IN: 855 mL / OUT: 250 mL / NET: 605 mL          MEDICATIONS:  MEDICATIONS  (STANDING):  ALBUTerol    90 MICROgram(s) HFA Inhaler 1 Puff(s) Inhalation daily  albuterol/ipratropium for Nebulization 3 milliLiter(s) Nebulizer every 6 hours  budesonide  80 MICROgram(s)/formoterol 4.5 MICROgram(s) Inhaler 2 Puff(s) Inhalation daily  dextrose 5% + sodium chloride 0.45% 1000 milliLiter(s) (125 mL/Hr) IV Continuous <Continuous>  heparin   Injectable 5000 Unit(s) SubCutaneous every 8 hours  metoprolol tartrate Injectable 5 milliGRAM(s) IV Push every 6 hours  potassium chloride   Powder 40 milliEquivalent(s) Oral once  saline laxative (FLEET) Rectal Enema 1 Enema Rectal at bedtime  tamsulosin 0.4 milliGRAM(s) Oral at bedtime  tiotropium 18 MICROgram(s) Capsule 1 Capsule(s) Inhalation daily    MEDICATIONS  (PRN):  ondansetron Injectable 4 milliGRAM(s) IV Push every 6 hours PRN Nausea      PHYSICAL EXAM:  More Comfortable, no immediate distress  Eyes: PERRL, EOM intact; conjunctiva and sclera clear  Head: Normocephalic;  No Trauma  ENMT: No nasal discharge, hoarseness, cough or hemoptysis  Neck: Supple; non tender; no masses or deformities.    No JVD  Respiratory: CTA,  - WHEEZING   - RHONCHI  - RALES  - CRACKLES.  resolved   inspiratory splinting   Cardiovascular: Regular rate and rhythm. S1 and S2 Normal; No murmurs, gallops or rubs     - PPM/AICD  Gastrointestinal: Soft obese, mildly tender, less distended; + bowel sounds; No hepatosplenomegaly.     -PEG    -  GT   - HYDE  Genitourinary: No costovertebral angle tenderness. No dysuria  Extremities: AROM, No clubbing, cyanosis or edema    Vascular: Peripheral pulses palpable 2+ bilaterally  Neurological: Alert and responisve to stimuli   Skin: Warm and dry. No obvious rash  Lymph Nodes: No acute cervical or supraclavicular adenopathy  Psychiatric: Cooperative and appropriate mood    DEVICES:  - DENTURES   +IV R / L     - ETUBE   -TRACH   -CTUBE  R / L    LABS:                          14.2   8.73  )-----------( 214      ( 10 Dec 2021 06:51 )             42.8     12-10    139  |  106  |  8   ----------------------------<  123<H>  3.5   |  20<L>  |  0.74    Ca    8.8      10 Dec 2021 06:51  Phos  2.6     12-10  Mg     2.0     12-10        RADIOLOGY & ADDITIONAL STUDIES (The following images were personally reviewed):

## 2021-12-10 NOTE — PROGRESS NOTE ADULT - SUBJECTIVE AND OBJECTIVE BOX
Doing well after stent. Multiple liquid BMs and flatus.  No abdominal pain.  AFVSS  Abd soft, less distended, NT.    A/P: s/p colonic stent for benign stricture in sigmoid colon, likely due to diverticulitis.  Doing well  1. d/c home on full liquid diet.  Instructed on diet  2. No stimulant laxatives  3. Plan for lap/open sigmoid colectomy next week.  Risks including but not limited to bleeding, infection, anastomotic leak discussed with patient and partner last night.  Possibility of stent migration/failure and need to perform colostomy earlier discussed, symptoms reviewed.  4. I have sent bowel prep and antibiotics to his pharmacy.

## 2021-12-15 DIAGNOSIS — E87.6 HYPOKALEMIA: ICD-10-CM

## 2021-12-15 DIAGNOSIS — I10 ESSENTIAL (PRIMARY) HYPERTENSION: ICD-10-CM

## 2021-12-15 DIAGNOSIS — N40.0 BENIGN PROSTATIC HYPERPLASIA WITHOUT LOWER URINARY TRACT SYMPTOMS: ICD-10-CM

## 2021-12-15 DIAGNOSIS — K56.699 OTHER INTESTINAL OBSTRUCTION UNSPECIFIED AS TO PARTIAL VERSUS COMPLETE OBSTRUCTION: ICD-10-CM

## 2021-12-15 DIAGNOSIS — K57.30 DIVERTICULOSIS OF LARGE INTESTINE WITHOUT PERFORATION OR ABSCESS WITHOUT BLEEDING: ICD-10-CM

## 2021-12-15 DIAGNOSIS — K59.89 OTHER SPECIFIED FUNCTIONAL INTESTINAL DISORDERS: ICD-10-CM

## 2021-12-15 DIAGNOSIS — Z80.0 FAMILY HISTORY OF MALIGNANT NEOPLASM OF DIGESTIVE ORGANS: ICD-10-CM

## 2021-12-15 DIAGNOSIS — J45.909 UNSPECIFIED ASTHMA, UNCOMPLICATED: ICD-10-CM

## 2021-12-15 DIAGNOSIS — E66.9 OBESITY, UNSPECIFIED: ICD-10-CM

## 2021-12-15 DIAGNOSIS — R10.9 UNSPECIFIED ABDOMINAL PAIN: ICD-10-CM

## 2021-12-15 DIAGNOSIS — Z87.891 PERSONAL HISTORY OF NICOTINE DEPENDENCE: ICD-10-CM

## 2021-12-15 DIAGNOSIS — K63.89 OTHER SPECIFIED DISEASES OF INTESTINE: ICD-10-CM

## 2021-12-16 ENCOUNTER — TRANSCRIPTION ENCOUNTER (OUTPATIENT)
Age: 76
End: 2021-12-16

## 2021-12-17 ENCOUNTER — RESULT REVIEW (OUTPATIENT)
Age: 76
End: 2021-12-17

## 2021-12-17 ENCOUNTER — INPATIENT (INPATIENT)
Facility: HOSPITAL | Age: 76
LOS: 5 days | Discharge: HOME | DRG: 330 | End: 2021-12-23
Attending: SURGERY | Admitting: SURGERY
Payer: MEDICARE

## 2021-12-17 VITALS
SYSTOLIC BLOOD PRESSURE: 126 MMHG | HEART RATE: 90 BPM | TEMPERATURE: 99 F | HEIGHT: 68 IN | WEIGHT: 217.38 LBS | RESPIRATION RATE: 18 BRPM | OXYGEN SATURATION: 97 % | DIASTOLIC BLOOD PRESSURE: 74 MMHG

## 2021-12-17 DIAGNOSIS — J45.909 UNSPECIFIED ASTHMA, UNCOMPLICATED: ICD-10-CM

## 2021-12-17 DIAGNOSIS — K57.32 DIVERTICULITIS OF LARGE INTESTINE WITHOUT PERFORATION OR ABSCESS WITHOUT BLEEDING: ICD-10-CM

## 2021-12-17 DIAGNOSIS — I10 ESSENTIAL (PRIMARY) HYPERTENSION: ICD-10-CM

## 2021-12-17 DIAGNOSIS — K56.609 UNSPECIFIED INTESTINAL OBSTRUCTION, UNSPECIFIED AS TO PARTIAL VERSUS COMPLETE OBSTRUCTION: ICD-10-CM

## 2021-12-17 DIAGNOSIS — N40.0 BENIGN PROSTATIC HYPERPLASIA WITHOUT LOWER URINARY TRACT SYMPTOMS: ICD-10-CM

## 2021-12-17 DIAGNOSIS — J98.11 ATELECTASIS: ICD-10-CM

## 2021-12-17 DIAGNOSIS — Z79.899 OTHER LONG TERM (CURRENT) DRUG THERAPY: ICD-10-CM

## 2021-12-17 PROBLEM — Z87.438 PERSONAL HISTORY OF OTHER DISEASES OF MALE GENITAL ORGANS: Chronic | Status: ACTIVE | Noted: 2021-12-06

## 2021-12-17 LAB
BUN SERPL-MCNC: 11 MG/DL — SIGNIFICANT CHANGE UP (ref 7–23)
CALCIUM SERPL-MCNC: 8.4 MG/DL — SIGNIFICANT CHANGE UP (ref 8.4–10.5)
CHLORIDE SERPL-SCNC: 103 MMOL/L — SIGNIFICANT CHANGE UP (ref 96–108)
CREAT SERPL-MCNC: 0.98 MG/DL — SIGNIFICANT CHANGE UP (ref 0.5–1.3)
GLUCOSE BLDC GLUCOMTR-MCNC: 95 MG/DL — SIGNIFICANT CHANGE UP (ref 70–99)
HCT VFR BLD CALC: 40.7 % — SIGNIFICANT CHANGE UP (ref 39–50)
HGB BLD-MCNC: 13.4 G/DL — SIGNIFICANT CHANGE UP (ref 13–17)
MAGNESIUM SERPL-MCNC: 1.6 MG/DL — SIGNIFICANT CHANGE UP (ref 1.6–2.6)
MCHC RBC-ENTMCNC: 27.7 PG — SIGNIFICANT CHANGE UP (ref 27–34)
MCHC RBC-ENTMCNC: 32.9 GM/DL — SIGNIFICANT CHANGE UP (ref 32–36)
MCV RBC AUTO: 84.1 FL — SIGNIFICANT CHANGE UP (ref 80–100)
NRBC # BLD: 0 /100 WBCS — SIGNIFICANT CHANGE UP (ref 0–0)
PHOSPHATE SERPL-MCNC: 4.5 MG/DL — SIGNIFICANT CHANGE UP (ref 2.5–4.5)
PLATELET # BLD AUTO: 220 K/UL — SIGNIFICANT CHANGE UP (ref 150–400)
POTASSIUM SERPL-MCNC: 3.6 MMOL/L — SIGNIFICANT CHANGE UP (ref 3.5–5.3)
POTASSIUM SERPL-SCNC: 3.6 MMOL/L — SIGNIFICANT CHANGE UP (ref 3.5–5.3)
RBC # BLD: 4.84 M/UL — SIGNIFICANT CHANGE UP (ref 4.2–5.8)
RBC # FLD: 14.4 % — SIGNIFICANT CHANGE UP (ref 10.3–14.5)
SODIUM SERPL-SCNC: 139 MMOL/L — SIGNIFICANT CHANGE UP (ref 135–145)
WBC # BLD: 15.45 K/UL — HIGH (ref 3.8–10.5)
WBC # FLD AUTO: 15.45 K/UL — HIGH (ref 3.8–10.5)

## 2021-12-17 PROCEDURE — 88307 TISSUE EXAM BY PATHOLOGIST: CPT | Mod: 26

## 2021-12-17 RX ORDER — ONDANSETRON 8 MG/1
4 TABLET, FILM COATED ORAL EVERY 6 HOURS
Refills: 0 | Status: DISCONTINUED | OUTPATIENT
Start: 2021-12-17 | End: 2021-12-23

## 2021-12-17 RX ORDER — TAMSULOSIN HYDROCHLORIDE 0.4 MG/1
0.4 CAPSULE ORAL AT BEDTIME
Refills: 0 | Status: DISCONTINUED | OUTPATIENT
Start: 2021-12-17 | End: 2021-12-23

## 2021-12-17 RX ORDER — TAMSULOSIN HYDROCHLORIDE 0.4 MG/1
1 CAPSULE ORAL
Qty: 0 | Refills: 0 | DISCHARGE

## 2021-12-17 RX ORDER — ALBUTEROL 90 UG/1
2 AEROSOL, METERED ORAL EVERY 6 HOURS
Refills: 0 | Status: DISCONTINUED | OUTPATIENT
Start: 2021-12-17 | End: 2021-12-23

## 2021-12-17 RX ORDER — ACETAMINOPHEN 500 MG
1000 TABLET ORAL ONCE
Refills: 0 | Status: COMPLETED | OUTPATIENT
Start: 2021-12-17 | End: 2021-12-17

## 2021-12-17 RX ORDER — HEPARIN SODIUM 5000 [USP'U]/ML
5000 INJECTION INTRAVENOUS; SUBCUTANEOUS ONCE
Refills: 0 | Status: DISCONTINUED | OUTPATIENT
Start: 2021-12-17 | End: 2021-12-17

## 2021-12-17 RX ORDER — FLUTICASONE FUROATE AND VILANTEROL TRIFENATATE 100; 25 UG/1; UG/1
1 POWDER RESPIRATORY (INHALATION)
Qty: 0 | Refills: 0 | DISCHARGE

## 2021-12-17 RX ORDER — HYDROMORPHONE HYDROCHLORIDE 2 MG/ML
0.25 INJECTION INTRAMUSCULAR; INTRAVENOUS; SUBCUTANEOUS
Refills: 0 | Status: DISCONTINUED | OUTPATIENT
Start: 2021-12-17 | End: 2021-12-18

## 2021-12-17 RX ORDER — ACETAMINOPHEN 500 MG
1000 TABLET ORAL EVERY 6 HOURS
Refills: 0 | Status: DISCONTINUED | OUTPATIENT
Start: 2021-12-17 | End: 2021-12-23

## 2021-12-17 RX ORDER — BUPIVACAINE 13.3 MG/ML
20 INJECTION, SUSPENSION, LIPOSOMAL INFILTRATION ONCE
Refills: 0 | Status: DISCONTINUED | OUTPATIENT
Start: 2021-12-17 | End: 2021-12-17

## 2021-12-17 RX ORDER — HEPARIN SODIUM 5000 [USP'U]/ML
5000 INJECTION INTRAVENOUS; SUBCUTANEOUS EVERY 8 HOURS
Refills: 0 | Status: DISCONTINUED | OUTPATIENT
Start: 2021-12-18 | End: 2021-12-23

## 2021-12-17 RX ORDER — SODIUM CHLORIDE 9 MG/ML
1000 INJECTION, SOLUTION INTRAVENOUS
Refills: 0 | Status: DISCONTINUED | OUTPATIENT
Start: 2021-12-17 | End: 2021-12-20

## 2021-12-17 RX ORDER — UMECLIDINIUM 62.5 UG/1
0 AEROSOL, POWDER ORAL
Qty: 0 | Refills: 0 | DISCHARGE

## 2021-12-17 RX ORDER — BUDESONIDE AND FORMOTEROL FUMARATE DIHYDRATE 160; 4.5 UG/1; UG/1
2 AEROSOL RESPIRATORY (INHALATION)
Refills: 0 | Status: DISCONTINUED | OUTPATIENT
Start: 2021-12-17 | End: 2021-12-23

## 2021-12-17 RX ORDER — BUPIVACAINE 13.3 MG/ML
20 INJECTION, SUSPENSION, LIPOSOMAL INFILTRATION ONCE
Refills: 0 | Status: DISCONTINUED | OUTPATIENT
Start: 2021-12-17 | End: 2021-12-20

## 2021-12-17 RX ADMIN — HEPARIN SODIUM 5000 UNIT(S): 5000 INJECTION INTRAVENOUS; SUBCUTANEOUS at 13:21

## 2021-12-17 RX ADMIN — Medication 1000 MILLIGRAM(S): at 23:49

## 2021-12-17 RX ADMIN — SODIUM CHLORIDE 40 MILLILITER(S): 9 INJECTION, SOLUTION INTRAVENOUS at 22:16

## 2021-12-17 RX ADMIN — Medication 1000 MILLIGRAM(S): at 13:21

## 2021-12-17 NOTE — PATIENT PROFILE ADULT - NSPROGENOTHERPROVIDER_GEN_A_NUR
"Requested Prescriptions   Pending Prescriptions Disp Refills     estradiol (ESTRACE) 1 MG tablet [Pharmacy Med Name: ESTRADIOL 1 MG TABLET] 45 tablet 0     Sig: TAKE 1/2 TABLET BY MOUTH DAILY       Hormone Replacement Therapy Failed - 1/8/2021  8:33 AM        Failed - Blood pressure under 140/90 in past 12 months     BP Readings from Last 3 Encounters:   07/01/19 146/80   03/12/18 114/68   02/27/17 138/80                 Passed - Recent (12 mo) or future (30 days) visit within the authorizing provider's specialty     Patient has had an office visit with the authorizing provider or a provider within the authorizing providers department within the previous 12 mos or has a future within next 30 days. See \"Patient Info\" tab in inbasket, or \"Choose Columns\" in Meds & Orders section of the refill encounter.              Passed - Patient has mammogram in past 2 years on file if age 50-75        Passed - Medication is active on med list        Passed - Patient is 18 years of age or older        Passed - No active pregnancy on record        Passed - No positive pregnancy test on record in past 12 months           Last Written Prescription Date:  10/16/2020  Last Fill Quantity: 45,  # refills: 0   Last office visit: 7/1/2019 with prescribing provider:  Ricky Tom   Future Office Visit:   Next 5 appointments (look out 90 days)    Jan 27, 2021 11:30 AM  PHYSICAL with Ricky Tom MD  [facility] ([facility]) [address] [place] [id number] [phone]                 "
Prescription approved per [facility] Refill Protocol.    Micaela Mooney RN on 1/8/2021 at 8:50 AM    
none

## 2021-12-17 NOTE — BRIEF OPERATIVE NOTE - OPERATION/FINDINGS
10cm Pfannensteil incision created and hand-assist port placed. All ports placed under direct visualization. Strictured sigmoid identified with extensive colonic adhesions. Splenic flexure mobilized. Distal margin taken with Contour stapler at healthy rectum. Mesentery taken down with Ligasure. Sigmoid extracorporealized via Pfannensteil incision and proximal margin taken at healthy colon with knife. Anvil placed into colon and purse stringed with 3-0 PDS. 28EEA stapler advanced into rectum; however, upon firing, given thickened and dilated colon, perforated the rectum. Stapler was not fired, removed from rectum. Colorectal handsewn anastomosis created with 2-0 PDS interrupted sutures in one layer. Air leak test positive; areas of leak oversewn with 2-0 PDS until air leak test negative. Flexible sigmoidoscopy revealed healthy anastomosis with no bleeding. 19Fr MICK drain left in the pelvis. Closing tray utilized; gowns and gloves changed. Fascia at midline incision closed with 0 Vicryl running on peritoneal layer and 0 Vicryl for fascia running. Fascia at 12mm umbilical port closed with 0 Vicryl figure of 8. Closed skin with 4-0 Monocryl. Loop ileostomy created and Brooked with 3-0 Vicryl. 10cm Pfannensteil incision created and hand-assist port placed. All ports placed under direct visualization. Strictured sigmoid identified with extensive interloop adhesions. Splenic flexure mobilized. Distal margin taken with Contour stapler at healthy rectum. Mesentery taken down with Ligasure. Sigmoid extracorporealized via Pfannensteil incision and proximal margin taken at healthy colon with knife. Anvil placed into colon and purse stringed with 3-0 PDS. 28EEA stapler advanced into rectum; however, upon closing stapler, perforation of rectum at the closure site noted. Stapler was not fired, removed from rectum. Colorectal handsewn anastomosis created with 2-0 PDS interrupted sutures in one layer. Air leak test positive; areas of leak oversewn with 2-0 PDS until air leak test negative. Flexible sigmoidoscopy revealed healthy anastomosis with no bleeding. 19Fr MICK drain left in the pelvis. Closing tray utilized; gowns and gloves changed. Fascia at midline incision closed with 0 Vicryl running on peritoneal layer and 0 Vicryl for fascia running. Fascia at 12mm umbilical port closed with 0 Vicryl figure of 8. Closed skin with 4-0 Monocryl. Loop ileostomy created and Brooked with 3-0 Vicryl.

## 2021-12-17 NOTE — PRE-OP CHECKLIST - SELECT TESTS ORDERED
FS 95, ECHO, CT Abd/pelvis/CBC/CMP/PT/PTT/INR/Type and Screen/Urinalysis/EKG/CXR/POCT Blood Glucose/COVID-19

## 2021-12-17 NOTE — H&P ADULT - ATTENDING COMMENTS
Colonoscopy consistent with benign stricture.  Prepped well.  Lap/open sigmoid colectomy today.  Possibility of ileostomy discussed.

## 2021-12-17 NOTE — PATIENT PROFILE ADULT - FALL HARM RISK - UNIVERSAL INTERVENTIONS
Bed in lowest position, wheels locked, appropriate side rails in place/Call bell, personal items and telephone in reach/Instruct patient to call for assistance before getting out of bed or chair/Non-slip footwear when patient is out of bed/[place] to call system/Physically safe environment - no spills, clutter or unnecessary equipment/Purposeful Proactive Rounding/Room/bathroom lighting operational, light cord in reach

## 2021-12-17 NOTE — PROGRESS NOTE ADULT - SUBJECTIVE AND OBJECTIVE BOX
Procedure: Hand assisted laparoscopic sigmoidectomy, diverting ileostomy   Surgeon: Dr. Munoz    S: Pt seen and examined at bedside, has no complaints. Denies CP, SOB, DOTY, calf tenderness. Pain controlled with medication.     O:  T(C): 36.1 (12-17-21 @ 22:11), Max: 36.1 (12-17-21 @ 22:11)  T(F): 97 (12-17-21 @ 22:11), Max: 97 (12-17-21 @ 22:11)  HR: 83 (12-17-21 @ 22:56) (82 - 90)  BP: 140/74 (12-17-21 @ 22:56) (140/74 - 157/81)  RR: 20 (12-17-21 @ 22:56) (20 - 26)  SpO2: 96% (12-17-21 @ 22:56) (94% - 99%)  Wt(kg): --            Gen: NAD, resting comfortably in bed  C/V: NSR  Pulm: Nonlabored breathing, no respiratory distress  Abd: abdomen is soft, nontender, non distended, no rebound or guarding, incisions are clean dry and intact, stoma is pink, patent and well perfused, ostomy with SS OP, MICK drain in place with SS OP  Extrem: WWP, no calf edema, SCDs in place

## 2021-12-17 NOTE — BRIEF OPERATIVE NOTE - NSICDXBRIEFPROCEDURE_GEN_ALL_CORE_FT
PROCEDURES:  Hand assisted laparoscopic sigmoidectomy 17-Dec-2021 21:02:58  Diana Hendrix  Ileostomy, loop, open 17-Dec-2021 21:03:19  Diana Hendrix

## 2021-12-17 NOTE — PROGRESS NOTE ADULT - ASSESSMENT
76M PMHx HTN, asthma, recent admission for obstructing sigmoid stricture suspicious for adenocarcinoma, s/p colonoscopy and stent placement, presents today for elective hand-assisted laparoscopic sigmoidectomy with diverting loop ileostomy (12/17).    ERAS protocol  pain/nausea control  IS/OOB  CLD, IVF  [place]  SCDs, HSQ

## 2021-12-17 NOTE — H&P ADULT - ASSESSMENT
76M PMHx HTN, asthma, recent admission for obstructing sigmoid stricture suspicious for adenocarcinoma, s/p colonoscopy and stent placement, presents today for elective sigmoidectomy.     ERAS protocol  to OR for above  admit to colorectal surgery Team [facility]  discussed with attending surgeon

## 2021-12-17 NOTE — H&P ADULT - HISTORY OF PRESENT ILLNESS
76M PMHx HTN, asthma, recent admission for obstructing sigmoid stricture suspicious for adenocarcinoma, s/p colonoscopy and stent placement, presents today for elective sigmoidectomy. Currently passing flatus and having BMs. Asymptomatic.

## 2021-12-18 LAB
ANION GAP SERPL CALC-SCNC: 10 MMOL/L — SIGNIFICANT CHANGE UP (ref 5–17)
ANION GAP SERPL CALC-SCNC: 16 MMOL/L — SIGNIFICANT CHANGE UP (ref 5–17)
BUN SERPL-MCNC: 10 MG/DL — SIGNIFICANT CHANGE UP (ref 7–23)
CALCIUM SERPL-MCNC: 8.6 MG/DL — SIGNIFICANT CHANGE UP (ref 8.4–10.5)
CHLORIDE SERPL-SCNC: 107 MMOL/L — SIGNIFICANT CHANGE UP (ref 96–108)
CO2 SERPL-SCNC: 20 MMOL/L — LOW (ref 22–31)
CO2 SERPL-SCNC: 25 MMOL/L — SIGNIFICANT CHANGE UP (ref 22–31)
CREAT SERPL-MCNC: 0.92 MG/DL — SIGNIFICANT CHANGE UP (ref 0.5–1.3)
GLUCOSE SERPL-MCNC: 155 MG/DL — HIGH (ref 70–99)
GLUCOSE SERPL-MCNC: 163 MG/DL — HIGH (ref 70–99)
HCT VFR BLD CALC: 39 % — SIGNIFICANT CHANGE UP (ref 39–50)
HGB BLD-MCNC: 12.9 G/DL — LOW (ref 13–17)
MAGNESIUM SERPL-MCNC: 1.8 MG/DL — SIGNIFICANT CHANGE UP (ref 1.6–2.6)
MCHC RBC-ENTMCNC: 27.7 PG — SIGNIFICANT CHANGE UP (ref 27–34)
MCHC RBC-ENTMCNC: 33.1 GM/DL — SIGNIFICANT CHANGE UP (ref 32–36)
MCV RBC AUTO: 83.7 FL — SIGNIFICANT CHANGE UP (ref 80–100)
NRBC # BLD: 0 /100 WBCS — SIGNIFICANT CHANGE UP (ref 0–0)
PHOSPHATE SERPL-MCNC: 3.5 MG/DL — SIGNIFICANT CHANGE UP (ref 2.5–4.5)
PLATELET # BLD AUTO: 229 K/UL — SIGNIFICANT CHANGE UP (ref 150–400)
POTASSIUM SERPL-MCNC: 3.8 MMOL/L — SIGNIFICANT CHANGE UP (ref 3.5–5.3)
POTASSIUM SERPL-SCNC: 3.8 MMOL/L — SIGNIFICANT CHANGE UP (ref 3.5–5.3)
RBC # BLD: 4.66 M/UL — SIGNIFICANT CHANGE UP (ref 4.2–5.8)
RBC # FLD: 14.6 % — HIGH (ref 10.3–14.5)
SODIUM SERPL-SCNC: 142 MMOL/L — SIGNIFICANT CHANGE UP (ref 135–145)
WBC # BLD: 12.42 K/UL — HIGH (ref 3.8–10.5)
WBC # FLD AUTO: 12.42 K/UL — HIGH (ref 3.8–10.5)

## 2021-12-18 RX ORDER — POTASSIUM CHLORIDE 20 MEQ
20 PACKET (EA) ORAL ONCE
Refills: 0 | Status: COMPLETED | OUTPATIENT
Start: 2021-12-18 | End: 2021-12-18

## 2021-12-18 RX ORDER — METOPROLOL TARTRATE 50 MG
50 TABLET ORAL
Refills: 0 | Status: DISCONTINUED | OUTPATIENT
Start: 2021-12-18 | End: 2021-12-23

## 2021-12-18 RX ORDER — KETOROLAC TROMETHAMINE 30 MG/ML
15 SYRINGE (ML) INJECTION EVERY 6 HOURS
Refills: 0 | Status: DISCONTINUED | OUTPATIENT
Start: 2021-12-18 | End: 2021-12-18

## 2021-12-18 RX ORDER — MAGNESIUM SULFATE 500 MG/ML
1 VIAL (ML) INJECTION ONCE
Refills: 0 | Status: COMPLETED | OUTPATIENT
Start: 2021-12-18 | End: 2021-12-18

## 2021-12-18 RX ORDER — METOPROLOL TARTRATE 50 MG
50 TABLET ORAL DAILY
Refills: 0 | Status: DISCONTINUED | OUTPATIENT
Start: 2021-12-18 | End: 2021-12-18

## 2021-12-18 RX ORDER — KETOROLAC TROMETHAMINE 30 MG/ML
15 SYRINGE (ML) INJECTION EVERY 6 HOURS
Refills: 0 | Status: DISCONTINUED | OUTPATIENT
Start: 2021-12-18 | End: 2021-12-20

## 2021-12-18 RX ADMIN — HEPARIN SODIUM 5000 UNIT(S): 5000 INJECTION INTRAVENOUS; SUBCUTANEOUS at 21:25

## 2021-12-18 RX ADMIN — Medication 15 MILLIGRAM(S): at 18:05

## 2021-12-18 RX ADMIN — Medication 20 MILLIEQUIVALENT(S): at 08:03

## 2021-12-18 RX ADMIN — Medication 1000 MILLIGRAM(S): at 18:05

## 2021-12-18 RX ADMIN — HYDROMORPHONE HYDROCHLORIDE 0.25 MILLIGRAM(S): 2 INJECTION INTRAMUSCULAR; INTRAVENOUS; SUBCUTANEOUS at 08:30

## 2021-12-18 RX ADMIN — HEPARIN SODIUM 5000 UNIT(S): 5000 INJECTION INTRAVENOUS; SUBCUTANEOUS at 13:46

## 2021-12-18 RX ADMIN — Medication 1000 MILLIGRAM(S): at 05:03

## 2021-12-18 RX ADMIN — Medication 100 GRAM(S): at 08:03

## 2021-12-18 RX ADMIN — BUDESONIDE AND FORMOTEROL FUMARATE DIHYDRATE 2 PUFF(S): 160; 4.5 AEROSOL RESPIRATORY (INHALATION) at 05:57

## 2021-12-18 RX ADMIN — BUDESONIDE AND FORMOTEROL FUMARATE DIHYDRATE 2 PUFF(S): 160; 4.5 AEROSOL RESPIRATORY (INHALATION) at 18:10

## 2021-12-18 RX ADMIN — Medication 1000 MILLIGRAM(S): at 12:09

## 2021-12-18 RX ADMIN — HEPARIN SODIUM 5000 UNIT(S): 5000 INJECTION INTRAVENOUS; SUBCUTANEOUS at 05:03

## 2021-12-18 RX ADMIN — ALBUTEROL 2 PUFF(S): 90 AEROSOL, METERED ORAL at 13:45

## 2021-12-18 RX ADMIN — TAMSULOSIN HYDROCHLORIDE 0.4 MILLIGRAM(S): 0.4 CAPSULE ORAL at 21:25

## 2021-12-18 RX ADMIN — HYDROMORPHONE HYDROCHLORIDE 0.25 MILLIGRAM(S): 2 INJECTION INTRAMUSCULAR; INTRAVENOUS; SUBCUTANEOUS at 08:19

## 2021-12-18 RX ADMIN — Medication 15 MILLIGRAM(S): at 12:09

## 2021-12-18 RX ADMIN — Medication 1000 MILLIGRAM(S): at 06:00

## 2021-12-18 RX ADMIN — HYDROMORPHONE HYDROCHLORIDE 0.25 MILLIGRAM(S): 2 INJECTION INTRAMUSCULAR; INTRAVENOUS; SUBCUTANEOUS at 13:46

## 2021-12-18 RX ADMIN — ALBUTEROL 2 PUFF(S): 90 AEROSOL, METERED ORAL at 23:21

## 2021-12-18 NOTE — PROGRESS NOTE ADULT - SUBJECTIVE AND OBJECTIVE BOX
GENERAL SURGERY PROGRESS NOTE    SUBJECTIVE: Patient seen and examined bedside. POD1 sigmoidectomy for obstructive stricture. POC wnl overnight. Hgb stable. On CLD. NAEON, VSS. Exam below.    heparin   Injectable 5000 Unit(s) SubCutaneous every 8 hours  tamsulosin 0.4 milliGRAM(s) Oral at bedtime      Vital Signs Last 24 Hrs  T(C): 36.8 (18 Dec 2021 04:38), Max: 37.2 (17 Dec 2021 11:29)  T(F): 98.2 (18 Dec 2021 04:38), Max: 98.9 (17 Dec 2021 11:29)  HR: 83 (18 Dec 2021 04:38) (69 - 90)  BP: 138/85 (18 Dec 2021 04:38) (126/74 - 157/81)  BP(mean): 97 (17 Dec 2021 23:56) (97 - 112)  RR: 18 (18 Dec 2021 04:38) (17 - 26)  SpO2: 98% (18 Dec 2021 04:38) (94% - 99%)  I&O's Detail    17 Dec 2021 07:01  -  18 Dec 2021 06:45  --------------------------------------------------------  IN:    Lactated Ringers: 360 mL  Total IN: 360 mL    OUT:    Bulb (mL): 140 mL    Ileostomy (mL): 60 mL    Indwelling Catheter - Urethral (mL): 1150 mL  Total OUT: 1350 mL    Total NET: -990 mL          PHYSICAL EXAM    General: NAD, resting comfortably in bed  C/V: NSR  Pulm: Nonlabored breathing, no respiratory distress on room air  Abd: soft, ND, appropriate incisional tenderness, no rebound tenderness, no guarding  Extrem: WWP, no edema, SCDs in place        LABS:                        13.4   15.45 )-----------( 220      ( 17 Dec 2021 23:19 )             40.7     12-17    139  |  103  |  11  ----------------------------<  155<H>  3.6   |  20<L>  |  0.98    Ca    8.4      17 Dec 2021 23:19  Phos  4.5     12-17  Mg     1.6     12-17            RADIOLOGY & ADDITIONAL STUDIES:   GENERAL SURGERY PROGRESS NOTE    SUBJECTIVE: Patient seen and examined bedside. POD1 sigmoidectomy for obstructive stricture. POC wnl overnight. Hgb stable. On CLD. NAEON, VSS. Exam below.    heparin   Injectable 5000 Unit(s) SubCutaneous every 8 hours  tamsulosin 0.4 milliGRAM(s) Oral at bedtime      Vital Signs Last 24 Hrs  T(C): 36.8 (18 Dec 2021 04:38), Max: 37.2 (17 Dec 2021 11:29)  T(F): 98.2 (18 Dec 2021 04:38), Max: 98.9 (17 Dec 2021 11:29)  HR: 83 (18 Dec 2021 04:38) (69 - 90)  BP: 138/85 (18 Dec 2021 04:38) (126/74 - 157/81)  BP(mean): 97 (17 Dec 2021 23:56) (97 - 112)  RR: 18 (18 Dec 2021 04:38) (17 - 26)  SpO2: 98% (18 Dec 2021 04:38) (94% - 99%)  I&O's Detail    17 Dec 2021 07:01  -  18 Dec 2021 06:45  --------------------------------------------------------  IN:    Lactated Ringers: 360 mL  Total IN: 360 mL    OUT:    Bulb (mL): 140 mL    Ileostomy (mL): 60 mL    Indwelling Catheter - Urethral (mL): 1150 mL  Total OUT: 1350 mL    Total NET: -990 mL          PHYSICAL EXAM    General: NAD, resting comfortably in bed  C/V: NSR  Pulm: Nonlabored breathing, no respiratory distress on room air  Abd: soft, mild distension, appropriate incisional tenderness, no rebound tenderness, no guarding  Extrem: WWP, no edema, SCDs in place        LABS:                        13.4   15.45 )-----------( 220      ( 17 Dec 2021 23:19 )             40.7     12-17    139  |  103  |  11  ----------------------------<  155<H>  3.6   |  20<L>  |  0.98    Ca    8.4      17 Dec 2021 23:19  Phos  4.5     12-17  Mg     1.6     12-17            RADIOLOGY & ADDITIONAL STUDIES:

## 2021-12-19 LAB
ANION GAP SERPL CALC-SCNC: 14 MMOL/L — SIGNIFICANT CHANGE UP (ref 5–17)
BUN SERPL-MCNC: 16 MG/DL — SIGNIFICANT CHANGE UP (ref 7–23)
CALCIUM SERPL-MCNC: 9.4 MG/DL — SIGNIFICANT CHANGE UP (ref 8.4–10.5)
CHLORIDE SERPL-SCNC: 98 MMOL/L — SIGNIFICANT CHANGE UP (ref 96–108)
CO2 SERPL-SCNC: 24 MMOL/L — SIGNIFICANT CHANGE UP (ref 22–31)
CREAT SERPL-MCNC: 1.14 MG/DL — SIGNIFICANT CHANGE UP (ref 0.5–1.3)
GLUCOSE SERPL-MCNC: 140 MG/DL — HIGH (ref 70–99)
HCT VFR BLD CALC: 48.7 % — SIGNIFICANT CHANGE UP (ref 39–50)
HGB BLD-MCNC: 15.8 G/DL — SIGNIFICANT CHANGE UP (ref 13–17)
MAGNESIUM SERPL-MCNC: 2.3 MG/DL — SIGNIFICANT CHANGE UP (ref 1.6–2.6)
MCHC RBC-ENTMCNC: 27.4 PG — SIGNIFICANT CHANGE UP (ref 27–34)
MCHC RBC-ENTMCNC: 32.4 GM/DL — SIGNIFICANT CHANGE UP (ref 32–36)
MCV RBC AUTO: 84.4 FL — SIGNIFICANT CHANGE UP (ref 80–100)
NRBC # BLD: 0 /100 WBCS — SIGNIFICANT CHANGE UP (ref 0–0)
PHOSPHATE SERPL-MCNC: 3 MG/DL — SIGNIFICANT CHANGE UP (ref 2.5–4.5)
PLATELET # BLD AUTO: 302 K/UL — SIGNIFICANT CHANGE UP (ref 150–400)
POTASSIUM SERPL-MCNC: 3.6 MMOL/L — SIGNIFICANT CHANGE UP (ref 3.5–5.3)
POTASSIUM SERPL-SCNC: 3.6 MMOL/L — SIGNIFICANT CHANGE UP (ref 3.5–5.3)
RBC # BLD: 5.77 M/UL — SIGNIFICANT CHANGE UP (ref 4.2–5.8)
RBC # FLD: 14.9 % — HIGH (ref 10.3–14.5)
SODIUM SERPL-SCNC: 136 MMOL/L — SIGNIFICANT CHANGE UP (ref 135–145)
WBC # BLD: 14.65 K/UL — HIGH (ref 3.8–10.5)
WBC # FLD AUTO: 14.65 K/UL — HIGH (ref 3.8–10.5)

## 2021-12-19 RX ORDER — SODIUM CHLORIDE 9 MG/ML
500 INJECTION, SOLUTION INTRAVENOUS ONCE
Refills: 0 | Status: COMPLETED | OUTPATIENT
Start: 2021-12-19 | End: 2021-12-19

## 2021-12-19 RX ORDER — SODIUM CHLORIDE 9 MG/ML
1000 INJECTION, SOLUTION INTRAVENOUS ONCE
Refills: 0 | Status: COMPLETED | OUTPATIENT
Start: 2021-12-19 | End: 2021-12-19

## 2021-12-19 RX ORDER — POTASSIUM CHLORIDE 20 MEQ
40 PACKET (EA) ORAL ONCE
Refills: 0 | Status: COMPLETED | OUTPATIENT
Start: 2021-12-19 | End: 2021-12-19

## 2021-12-19 RX ORDER — POTASSIUM CHLORIDE 20 MEQ
10 PACKET (EA) ORAL
Refills: 0 | Status: DISCONTINUED | OUTPATIENT
Start: 2021-12-19 | End: 2021-12-23

## 2021-12-19 RX ORDER — HYDROMORPHONE HYDROCHLORIDE 2 MG/ML
0.25 INJECTION INTRAMUSCULAR; INTRAVENOUS; SUBCUTANEOUS ONCE
Refills: 0 | Status: DISCONTINUED | OUTPATIENT
Start: 2021-12-19 | End: 2021-12-20

## 2021-12-19 RX ADMIN — ALBUTEROL 2 PUFF(S): 90 AEROSOL, METERED ORAL at 12:20

## 2021-12-19 RX ADMIN — HEPARIN SODIUM 5000 UNIT(S): 5000 INJECTION INTRAVENOUS; SUBCUTANEOUS at 16:31

## 2021-12-19 RX ADMIN — Medication 15 MILLIGRAM(S): at 23:47

## 2021-12-19 RX ADMIN — Medication 50 MILLIGRAM(S): at 18:13

## 2021-12-19 RX ADMIN — Medication 15 MILLIGRAM(S): at 19:14

## 2021-12-19 RX ADMIN — TAMSULOSIN HYDROCHLORIDE 0.4 MILLIGRAM(S): 0.4 CAPSULE ORAL at 21:44

## 2021-12-19 RX ADMIN — Medication 15 MILLIGRAM(S): at 00:02

## 2021-12-19 RX ADMIN — Medication 15 MILLIGRAM(S): at 00:15

## 2021-12-19 RX ADMIN — Medication 1000 MILLIGRAM(S): at 13:19

## 2021-12-19 RX ADMIN — Medication 1000 MILLIGRAM(S): at 19:14

## 2021-12-19 RX ADMIN — Medication 15 MILLIGRAM(S): at 18:14

## 2021-12-19 RX ADMIN — Medication 50 MILLIGRAM(S): at 05:13

## 2021-12-19 RX ADMIN — Medication 1000 MILLIGRAM(S): at 00:02

## 2021-12-19 RX ADMIN — Medication 50 MILLIGRAM(S): at 00:02

## 2021-12-19 RX ADMIN — HEPARIN SODIUM 5000 UNIT(S): 5000 INJECTION INTRAVENOUS; SUBCUTANEOUS at 05:13

## 2021-12-19 RX ADMIN — Medication 1000 MILLIGRAM(S): at 01:00

## 2021-12-19 RX ADMIN — Medication 15 MILLIGRAM(S): at 12:20

## 2021-12-19 RX ADMIN — Medication 15 MILLIGRAM(S): at 13:20

## 2021-12-19 RX ADMIN — BUDESONIDE AND FORMOTEROL FUMARATE DIHYDRATE 2 PUFF(S): 160; 4.5 AEROSOL RESPIRATORY (INHALATION) at 05:12

## 2021-12-19 RX ADMIN — Medication 1000 MILLIGRAM(S): at 06:21

## 2021-12-19 RX ADMIN — SODIUM CHLORIDE 2000 MILLILITER(S): 9 INJECTION, SOLUTION INTRAVENOUS at 08:28

## 2021-12-19 RX ADMIN — SODIUM CHLORIDE 1000 MILLILITER(S): 9 INJECTION, SOLUTION INTRAVENOUS at 13:51

## 2021-12-19 RX ADMIN — HEPARIN SODIUM 5000 UNIT(S): 5000 INJECTION INTRAVENOUS; SUBCUTANEOUS at 21:44

## 2021-12-19 RX ADMIN — Medication 1000 MILLIGRAM(S): at 12:19

## 2021-12-19 RX ADMIN — Medication 15 MILLIGRAM(S): at 23:55

## 2021-12-19 RX ADMIN — SODIUM CHLORIDE 1000 MILLILITER(S): 9 INJECTION, SOLUTION INTRAVENOUS at 23:46

## 2021-12-19 RX ADMIN — Medication 1000 MILLIGRAM(S): at 07:20

## 2021-12-19 RX ADMIN — SODIUM CHLORIDE 1000 MILLILITER(S): 9 INJECTION, SOLUTION INTRAVENOUS at 21:45

## 2021-12-19 RX ADMIN — ALBUTEROL 2 PUFF(S): 90 AEROSOL, METERED ORAL at 23:30

## 2021-12-19 RX ADMIN — Medication 1000 MILLIGRAM(S): at 18:14

## 2021-12-19 RX ADMIN — Medication 15 MILLIGRAM(S): at 05:13

## 2021-12-19 RX ADMIN — BUDESONIDE AND FORMOTEROL FUMARATE DIHYDRATE 2 PUFF(S): 160; 4.5 AEROSOL RESPIRATORY (INHALATION) at 19:10

## 2021-12-19 RX ADMIN — Medication 100 MILLIEQUIVALENT(S): at 16:32

## 2021-12-19 RX ADMIN — Medication 15 MILLIGRAM(S): at 05:30

## 2021-12-19 NOTE — PROVIDER CONTACT NOTE (CHANGE IN STATUS NOTIFICATION) - BACKGROUND
76M PMHx HTN, asthma, recent admission for obstructing sigmoid stricture suspicious for adenocarcinoma, s/p colonoscopy and stent placement, presents 12/17 elective hand-assisted laparoscopic sigmoidectomy with diverting loop ileostomy.

## 2021-12-19 NOTE — PROVIDER CONTACT NOTE (CHANGE IN STATUS NOTIFICATION) - ACTION/TREATMENT ORDERED:
Notified provider. Restart home med metoprolol 50 mg. No other interventions at this time. Will reassess. Will continue to monitor.

## 2021-12-19 NOTE — PROVIDER CONTACT NOTE (CHANGE IN STATUS NOTIFICATION) - ASSESSMENT
Pt denies pain, sob, chest pain, blurry vision and dizziness. temp 98.6F oral, , /91, RR 17, O2 94% RA. Pt resting comfortably. As per pt, pt take metoprolol.

## 2021-12-19 NOTE — PROGRESS NOTE ADULT - SUBJECTIVE AND OBJECTIVE BOX
INTERVAL HPI/OVERNIGHT EVENTS: taylor removed, restarted home dose of metoprolol 50mg, tachy to 140s, EKG done, sinus tach, voided 100cc, ostomy put out 1800 of green liquidy bowel sweat, +f    STATUS POST:  sigmoidectomy    POST OPERATIVE DAY #: 2    SUBJECTIVE:  pt seen sitting in chair, feeling so-so; denies pain, nausea/vomiting    MEDICATIONS  (STANDING):  acetaminophen     Tablet .. 1000 milliGRAM(s) Oral every 6 hours  budesonide  80 MICROgram(s)/formoterol 4.5 MICROgram(s) Inhaler 2 Puff(s) Inhalation two times a day  BUpivacaine liposome 1.3% Injectable (no eMAR) 20 milliLiter(s) Local Injection once  heparin   Injectable 5000 Unit(s) SubCutaneous every 8 hours  ketorolac   Injectable 15 milliGRAM(s) IV Push every 6 hours  lactated ringers Bolus 1000 milliLiter(s) IV Bolus once  lactated ringers. 1000 milliLiter(s) (40 mL/Hr) IV Continuous <Continuous>  metoprolol tartrate 50 milliGRAM(s) Oral two times a day  tamsulosin 0.4 milliGRAM(s) Oral at bedtime    MEDICATIONS  (PRN):  ALBUTerol    90 MICROgram(s) HFA Inhaler 2 Puff(s) Inhalation every 6 hours PRN Shortness of Breath and/or Wheezing  ondansetron Injectable 4 milliGRAM(s) IV Push every 6 hours PRN Nausea      Vital Signs Last 24 Hrs  T(C): 36.8 (19 Dec 2021 05:27), Max: 37.4 (18 Dec 2021 09:23)  T(F): 98.2 (19 Dec 2021 05:27), Max: 99.4 (18 Dec 2021 09:23)  HR: 85 (19 Dec 2021 05:27) (81 - 140)  BP: 130/86 (19 Dec 2021 05:27) (118/63 - 146/91)  BP(mean): --  RR: 17 (19 Dec 2021 05:27) (17 - 17)  SpO2: 94% (19 Dec 2021 05:27) (94% - 95%)    PHYSICAL EXAM:      Constitutional: A&Ox3    Respiratory: non labored breathing, no respiratory distress    Cardiovascular: NSR, RRR    Gastrointestinal: soft, nontender, nondistended,incisions c/d/i, ileostomy in place with liquid fecal output  ,   Extremities: (-) edema                  I&O's Detail    18 Dec 2021 07:01  -  19 Dec 2021 07:00  --------------------------------------------------------  IN:    Lactated Ringers: 960 mL    Oral Fluid: 1160 mL  Total IN: 2120 mL    OUT:    Bulb (mL): 190 mL    Ileostomy (mL): 2050 mL    Indwelling Catheter - Urethral (mL): 550 mL    Voided (mL): 150 mL  Total OUT: 2940 mL    Total NET: -820 mL      19 Dec 2021 07:01  -  19 Dec 2021 08:27  --------------------------------------------------------  IN:  Total IN: 0 mL    OUT:    Ileostomy (mL): 450 mL  Total OUT: 450 mL    Total NET: -450 mL          LABS:                        15.8   14.65 )-----------( 302      ( 19 Dec 2021 07:53 )             48.7     12-18    142  |  107  |  10  ----------------------------<  163<H>  3.8   |  25  |  0.92    Ca    8.6      18 Dec 2021 06:46  Phos  3.5     12-18  Mg     1.8     12-18            RADIOLOGY & ADDITIONAL STUDIES:

## 2021-12-19 NOTE — PROGRESS NOTE ADULT - SUBJECTIVE AND OBJECTIVE BOX
stoma with high output  episodic tachycardia  Afebrile  Abd soft  incisions clean      OOB  Fluid replacement  home meds  advance diet

## 2021-12-19 NOTE — PROVIDER CONTACT NOTE (CHANGE IN STATUS NOTIFICATION) - BACKGROUND
76M PMHx HTN, asthma, recent admission for obstructing sigmoid stricture suspicious for adenocarcinoma, s/p colonoscopy and stent placement, presents 12/17 elective hand-assisted laparoscopic sigmoidectomy with diverting loop ileostomy. Pt restarted back on metoprolol.

## 2021-12-19 NOTE — PROVIDER CONTACT NOTE (CHANGE IN STATUS NOTIFICATION) - ACTION/TREATMENT ORDERED:
Notified provider. EKG performed. bladder scan -93cc. Provider at bedside. As per provider, might be rebound tachy. EKG sinus tachy. No other interventions at this time. Will continue to monitor.

## 2021-12-19 NOTE — PROVIDER CONTACT NOTE (CHANGE IN STATUS NOTIFICATION) - ASSESSMENT
Pt denies sob, chest pain, blurry vision and dizziness. temp oral- 98.7F, HR-140, BP-128/69, RR-17 and O2- 94% RA. Pt restarted on metoprolol 50mg oral. DTV at 2-4am. HR went down to 108

## 2021-12-19 NOTE — PROGRESS NOTE ADULT - ASSESSMENT
76M PMHx HTN, asthma, recent admission for obstructing sigmoid stricture suspicious for adenocarcinoma, s/p colonoscopy and stent placement, presents today for elective hand-assisted laparoscopic sigmoidectomy with diverting loop ileostomy (12/17).    ERAS protocol  pain/nausea control  IS/OOB  CLD, IVF  SCDs, HSQ  encourage ambulation  poss adv diet this afternoon if tolerating 76M PMHx HTN, asthma, recent admission for obstructing sigmoid stricture, s/p colonoscopy and stent placement, presents today for elective hand-assisted laparoscopic sigmoidectomy with diverting loop ileostomy (12/17).    ERAS protocol  pain/nausea control  IS/OOB  CLD, IVF  SCDs, HSQ  encourage ambulation  poss adv diet this afternoon if tolerating

## 2021-12-20 LAB
ANION GAP SERPL CALC-SCNC: 17 MMOL/L — SIGNIFICANT CHANGE UP (ref 5–17)
BUN SERPL-MCNC: 33 MG/DL — HIGH (ref 7–23)
CALCIUM SERPL-MCNC: 9.3 MG/DL — SIGNIFICANT CHANGE UP (ref 8.4–10.5)
CHLORIDE SERPL-SCNC: 94 MMOL/L — LOW (ref 96–108)
CO2 SERPL-SCNC: 25 MMOL/L — SIGNIFICANT CHANGE UP (ref 22–31)
CREAT SERPL-MCNC: 1.25 MG/DL — SIGNIFICANT CHANGE UP (ref 0.5–1.3)
GLUCOSE SERPL-MCNC: 128 MG/DL — HIGH (ref 70–99)
HCT VFR BLD CALC: 44.1 % — SIGNIFICANT CHANGE UP (ref 39–50)
HGB BLD-MCNC: 15.2 G/DL — SIGNIFICANT CHANGE UP (ref 13–17)
MAGNESIUM SERPL-MCNC: 2.2 MG/DL — SIGNIFICANT CHANGE UP (ref 1.6–2.6)
MCHC RBC-ENTMCNC: 28.3 PG — SIGNIFICANT CHANGE UP (ref 27–34)
MCHC RBC-ENTMCNC: 34.5 GM/DL — SIGNIFICANT CHANGE UP (ref 32–36)
MCV RBC AUTO: 82.1 FL — SIGNIFICANT CHANGE UP (ref 80–100)
NRBC # BLD: 0 /100 WBCS — SIGNIFICANT CHANGE UP (ref 0–0)
PHOSPHATE SERPL-MCNC: 3.4 MG/DL — SIGNIFICANT CHANGE UP (ref 2.5–4.5)
PLATELET # BLD AUTO: 327 K/UL — SIGNIFICANT CHANGE UP (ref 150–400)
POTASSIUM SERPL-MCNC: 3.5 MMOL/L — SIGNIFICANT CHANGE UP (ref 3.5–5.3)
POTASSIUM SERPL-SCNC: 3.5 MMOL/L — SIGNIFICANT CHANGE UP (ref 3.5–5.3)
RBC # BLD: 5.37 M/UL — SIGNIFICANT CHANGE UP (ref 4.2–5.8)
RBC # FLD: 14.7 % — HIGH (ref 10.3–14.5)
SODIUM SERPL-SCNC: 136 MMOL/L — SIGNIFICANT CHANGE UP (ref 135–145)
WBC # BLD: 12.07 K/UL — HIGH (ref 3.8–10.5)
WBC # FLD AUTO: 12.07 K/UL — HIGH (ref 3.8–10.5)

## 2021-12-20 PROCEDURE — 71045 X-RAY EXAM CHEST 1 VIEW: CPT | Mod: 26

## 2021-12-20 RX ORDER — SODIUM CHLORIDE 9 MG/ML
500 INJECTION, SOLUTION INTRAVENOUS ONCE
Refills: 0 | Status: COMPLETED | OUTPATIENT
Start: 2021-12-20 | End: 2021-12-20

## 2021-12-20 RX ORDER — BENZOCAINE AND MENTHOL 5; 1 G/100ML; G/100ML
1 LIQUID ORAL THREE TIMES A DAY
Refills: 0 | Status: DISCONTINUED | OUTPATIENT
Start: 2021-12-20 | End: 2021-12-20

## 2021-12-20 RX ORDER — BENZOCAINE AND MENTHOL 5; 1 G/100ML; G/100ML
1 LIQUID ORAL THREE TIMES A DAY
Refills: 0 | Status: DISCONTINUED | OUTPATIENT
Start: 2021-12-20 | End: 2021-12-23

## 2021-12-20 RX ORDER — SODIUM CHLORIDE 9 MG/ML
1000 INJECTION, SOLUTION INTRAVENOUS
Refills: 0 | Status: DISCONTINUED | OUTPATIENT
Start: 2021-12-20 | End: 2021-12-20

## 2021-12-20 RX ORDER — LOPERAMIDE HCL 2 MG
2 TABLET ORAL THREE TIMES A DAY
Refills: 0 | Status: DISCONTINUED | OUTPATIENT
Start: 2021-12-20 | End: 2021-12-21

## 2021-12-20 RX ORDER — BENZOCAINE AND MENTHOL 5; 1 G/100ML; G/100ML
1 LIQUID ORAL ONCE
Refills: 0 | Status: DISCONTINUED | OUTPATIENT
Start: 2021-12-20 | End: 2021-12-20

## 2021-12-20 RX ORDER — POTASSIUM CHLORIDE 20 MEQ
40 PACKET (EA) ORAL ONCE
Refills: 0 | Status: COMPLETED | OUTPATIENT
Start: 2021-12-20 | End: 2021-12-20

## 2021-12-20 RX ORDER — SODIUM CHLORIDE 9 MG/ML
1000 INJECTION, SOLUTION INTRAVENOUS
Refills: 0 | Status: DISCONTINUED | OUTPATIENT
Start: 2021-12-20 | End: 2021-12-22

## 2021-12-20 RX ADMIN — SODIUM CHLORIDE 100 MILLILITER(S): 9 INJECTION, SOLUTION INTRAVENOUS at 10:56

## 2021-12-20 RX ADMIN — Medication 15 MILLIGRAM(S): at 05:59

## 2021-12-20 RX ADMIN — Medication 1000 MILLIGRAM(S): at 05:59

## 2021-12-20 RX ADMIN — Medication 50 MILLIGRAM(S): at 05:59

## 2021-12-20 RX ADMIN — Medication 50 MILLIGRAM(S): at 18:22

## 2021-12-20 RX ADMIN — HEPARIN SODIUM 5000 UNIT(S): 5000 INJECTION INTRAVENOUS; SUBCUTANEOUS at 12:43

## 2021-12-20 RX ADMIN — Medication 1000 MILLIGRAM(S): at 00:32

## 2021-12-20 RX ADMIN — Medication 1000 MILLIGRAM(S): at 01:00

## 2021-12-20 RX ADMIN — Medication 2 MILLIGRAM(S): at 13:16

## 2021-12-20 RX ADMIN — TAMSULOSIN HYDROCHLORIDE 0.4 MILLIGRAM(S): 0.4 CAPSULE ORAL at 21:59

## 2021-12-20 RX ADMIN — Medication 1000 MILLIGRAM(S): at 11:33

## 2021-12-20 RX ADMIN — Medication 1000 MILLIGRAM(S): at 06:20

## 2021-12-20 RX ADMIN — BUDESONIDE AND FORMOTEROL FUMARATE DIHYDRATE 2 PUFF(S): 160; 4.5 AEROSOL RESPIRATORY (INHALATION) at 06:00

## 2021-12-20 RX ADMIN — Medication 1000 MILLIGRAM(S): at 18:22

## 2021-12-20 RX ADMIN — BUDESONIDE AND FORMOTEROL FUMARATE DIHYDRATE 2 PUFF(S): 160; 4.5 AEROSOL RESPIRATORY (INHALATION) at 22:00

## 2021-12-20 RX ADMIN — Medication 1000 MILLIGRAM(S): at 11:32

## 2021-12-20 RX ADMIN — SODIUM CHLORIDE 1000 MILLILITER(S): 9 INJECTION, SOLUTION INTRAVENOUS at 14:28

## 2021-12-20 RX ADMIN — Medication 1000 MILLIGRAM(S): at 18:27

## 2021-12-20 RX ADMIN — Medication 15 MILLIGRAM(S): at 06:20

## 2021-12-20 RX ADMIN — Medication 2 MILLIGRAM(S): at 21:59

## 2021-12-20 RX ADMIN — BENZOCAINE AND MENTHOL 1 LOZENGE: 5; 1 LIQUID ORAL at 22:42

## 2021-12-20 RX ADMIN — HEPARIN SODIUM 5000 UNIT(S): 5000 INJECTION INTRAVENOUS; SUBCUTANEOUS at 05:59

## 2021-12-20 RX ADMIN — Medication 40 MILLIEQUIVALENT(S): at 08:49

## 2021-12-20 RX ADMIN — HEPARIN SODIUM 5000 UNIT(S): 5000 INJECTION INTRAVENOUS; SUBCUTANEOUS at 21:59

## 2021-12-20 RX ADMIN — SODIUM CHLORIDE 1000 MILLILITER(S): 9 INJECTION, SOLUTION INTRAVENOUS at 21:58

## 2021-12-20 NOTE — PROGRESS NOTE ADULT - SUBJECTIVE AND OBJECTIVE BOX
Tolerating clears.  No significant pain, no nausea.  AFVSS  Abd soft, mod distended, NT.  incision clean  Ileostomy p/p/p, output 4L/24h  u/o low.  MICK 30cc SSF    WBC 12k  hgb 15     A/P: POD 3 lap sigmoid colectomy, ileostomy for stricture.  High ileostomy output.  Low urine output, hemoconcentrated.  1. IV D5 half NS plus K at 100cc/h  2. Replace ileostomy output 0.5cc/cc with LR  3. OK to have LRD, but encouraged hydration.  4. OOB, IS  5. SQh  6. Stop ketorolac.  7. Stoma teaching  8. VNS.

## 2021-12-20 NOTE — PROGRESS NOTE ADULT - SUBJECTIVE AND OBJECTIVE BOX
STATUS POST: sigmoidectomy    POST OPERATIVE DAY #: 3     SUBJECTIVE:  This morning, he feels well; his pain is well-controlled. No nausea or vomiting. Passing flatus and having BMs. No acute complaints.    MEDICATIONS  (STANDING):  acetaminophen     Tablet .. 1000 milliGRAM(s) Oral every 6 hours  budesonide  80 MICROgram(s)/formoterol 4.5 MICROgram(s) Inhaler 2 Puff(s) Inhalation two times a day  BUpivacaine liposome 1.3% Injectable (no eMAR) 20 milliLiter(s) Local Injection once  dextrose 5% + sodium chloride 0.45% 1000 milliLiter(s) (100 mL/Hr) IV Continuous <Continuous>  heparin   Injectable 5000 Unit(s) SubCutaneous every 8 hours  loperamide 2 milliGRAM(s) Oral three times a day  metoprolol tartrate 50 milliGRAM(s) Oral two times a day  potassium chloride  10 mEq/100 mL IVPB 10 milliEquivalent(s) IV Intermittent every 1 hour  tamsulosin 0.4 milliGRAM(s) Oral at bedtime    MEDICATIONS  (PRN):  ALBUTerol    90 MICROgram(s) HFA Inhaler 2 Puff(s) Inhalation every 6 hours PRN Shortness of Breath and/or Wheezing  ondansetron Injectable 4 milliGRAM(s) IV Push every 6 hours PRN Nausea      Vital Signs Last 24 Hrs  T(C): 36.8 (20 Dec 2021 08:24), Max: 37.6 (19 Dec 2021 17:45)  T(F): 98.3 (20 Dec 2021 08:24), Max: 99.7 (19 Dec 2021 17:45)  HR: 83 (20 Dec 2021 08:24) (83 - 115)  BP: 121/78 (20 Dec 2021 08:24) (116/86 - 141/82)  BP(mean): --  RR: 17 (20 Dec 2021 08:24) (17 - 18)  SpO2: 97% (20 Dec 2021 08:24) (94% - 97%)    PHYSICAL EXAM:    Constitutional: A&Ox3    Respiratory: non labored breathing, no respiratory distress    Cardiovascular: NSR, RRR    Gastrointestinal: abdomen soft, distended                 Incision: clean, dry, intact, ileostomy with large amount of dark liquid output    Genitourinary: voiding, low urine output    Extremities: (-) edema, -calf tenderness                  I&O's Detail    19 Dec 2021 07:01  -  20 Dec 2021 07:00  --------------------------------------------------------  IN:    IV PiggyBack: 1500 mL    Lactated Ringers: 400 mL    Oral Fluid: 890 mL  Total IN: 2790 mL    OUT:    Bulb (mL): 135 mL    Ileostomy (mL): 5300 mL    Voided (mL): 425 mL  Total OUT: 5860 mL    Total NET: -3070 mL      20 Dec 2021 07:01  -  20 Dec 2021 09:59  --------------------------------------------------------  IN:    dextrose 5% + sodium chloride 0.45% w/ Additives: 300 mL  Total IN: 300 mL    OUT:    Ileostomy (mL): 630 mL  Total OUT: 630 mL    Total NET: -330 mL          LABS:                        15.2   12.07 )-----------( 327      ( 20 Dec 2021 07:58 )             44.1     12-20    136  |  94<L>  |  33<H>  ----------------------------<  128<H>  3.5   |  25  |  1.25    Ca    9.3      20 Dec 2021 07:58  Phos  3.4     12-20  Mg     2.2     12-20
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
## 2021-12-21 ENCOUNTER — TRANSCRIPTION ENCOUNTER (OUTPATIENT)
Age: 76
End: 2021-12-21

## 2021-12-21 LAB
ANION GAP SERPL CALC-SCNC: 11 MMOL/L — SIGNIFICANT CHANGE UP (ref 5–17)
BUN SERPL-MCNC: 43 MG/DL — HIGH (ref 7–23)
CALCIUM SERPL-MCNC: 8.9 MG/DL — SIGNIFICANT CHANGE UP (ref 8.4–10.5)
CHLORIDE SERPL-SCNC: 95 MMOL/L — LOW (ref 96–108)
CO2 SERPL-SCNC: 27 MMOL/L — SIGNIFICANT CHANGE UP (ref 22–31)
CREAT SERPL-MCNC: 1.03 MG/DL — SIGNIFICANT CHANGE UP (ref 0.5–1.3)
GLUCOSE SERPL-MCNC: 165 MG/DL — HIGH (ref 70–99)
HCT VFR BLD CALC: 42.3 % — SIGNIFICANT CHANGE UP (ref 39–50)
HGB BLD-MCNC: 14.4 G/DL — SIGNIFICANT CHANGE UP (ref 13–17)
MAGNESIUM SERPL-MCNC: 2.3 MG/DL — SIGNIFICANT CHANGE UP (ref 1.6–2.6)
MCHC RBC-ENTMCNC: 28.3 PG — SIGNIFICANT CHANGE UP (ref 27–34)
MCHC RBC-ENTMCNC: 34 GM/DL — SIGNIFICANT CHANGE UP (ref 32–36)
MCV RBC AUTO: 83.1 FL — SIGNIFICANT CHANGE UP (ref 80–100)
NRBC # BLD: 0 /100 WBCS — SIGNIFICANT CHANGE UP (ref 0–0)
PHOSPHATE SERPL-MCNC: 3.3 MG/DL — SIGNIFICANT CHANGE UP (ref 2.5–4.5)
PLATELET # BLD AUTO: 346 K/UL — SIGNIFICANT CHANGE UP (ref 150–400)
POTASSIUM SERPL-MCNC: 3.5 MMOL/L — SIGNIFICANT CHANGE UP (ref 3.5–5.3)
POTASSIUM SERPL-SCNC: 3.5 MMOL/L — SIGNIFICANT CHANGE UP (ref 3.5–5.3)
RBC # BLD: 5.09 M/UL — SIGNIFICANT CHANGE UP (ref 4.2–5.8)
RBC # FLD: 14.6 % — HIGH (ref 10.3–14.5)
SODIUM SERPL-SCNC: 133 MMOL/L — LOW (ref 135–145)
WBC # BLD: 11.46 K/UL — HIGH (ref 3.8–10.5)
WBC # FLD AUTO: 11.46 K/UL — HIGH (ref 3.8–10.5)

## 2021-12-21 RX ORDER — POTASSIUM CHLORIDE 20 MEQ
40 PACKET (EA) ORAL ONCE
Refills: 0 | Status: COMPLETED | OUTPATIENT
Start: 2021-12-21 | End: 2021-12-21

## 2021-12-21 RX ORDER — BENZOCAINE AND MENTHOL 5; 1 G/100ML; G/100ML
1 LIQUID ORAL ONCE
Refills: 0 | Status: DISCONTINUED | OUTPATIENT
Start: 2021-12-21 | End: 2021-12-21

## 2021-12-21 RX ORDER — LOPERAMIDE HCL 2 MG
4 TABLET ORAL EVERY 8 HOURS
Refills: 0 | Status: DISCONTINUED | OUTPATIENT
Start: 2021-12-21 | End: 2021-12-23

## 2021-12-21 RX ORDER — SODIUM CHLORIDE 9 MG/ML
500 INJECTION, SOLUTION INTRAVENOUS ONCE
Refills: 0 | Status: COMPLETED | OUTPATIENT
Start: 2021-12-21 | End: 2021-12-21

## 2021-12-21 RX ORDER — BENZOCAINE AND MENTHOL 5; 1 G/100ML; G/100ML
1 LIQUID ORAL ONCE
Refills: 0 | Status: COMPLETED | OUTPATIENT
Start: 2021-12-21 | End: 2021-12-22

## 2021-12-21 RX ADMIN — SODIUM CHLORIDE 1000 MILLILITER(S): 9 INJECTION, SOLUTION INTRAVENOUS at 06:38

## 2021-12-21 RX ADMIN — Medication 50 MILLIGRAM(S): at 17:06

## 2021-12-21 RX ADMIN — HEPARIN SODIUM 5000 UNIT(S): 5000 INJECTION INTRAVENOUS; SUBCUTANEOUS at 21:51

## 2021-12-21 RX ADMIN — ALBUTEROL 2 PUFF(S): 90 AEROSOL, METERED ORAL at 04:30

## 2021-12-21 RX ADMIN — BENZOCAINE AND MENTHOL 1 LOZENGE: 5; 1 LIQUID ORAL at 22:02

## 2021-12-21 RX ADMIN — Medication 2 MILLIGRAM(S): at 05:10

## 2021-12-21 RX ADMIN — BENZOCAINE AND MENTHOL 1 LOZENGE: 5; 1 LIQUID ORAL at 16:08

## 2021-12-21 RX ADMIN — Medication 4 MILLIGRAM(S): at 13:10

## 2021-12-21 RX ADMIN — Medication 4 MILLIGRAM(S): at 21:52

## 2021-12-21 RX ADMIN — BENZOCAINE AND MENTHOL 1 LOZENGE: 5; 1 LIQUID ORAL at 02:05

## 2021-12-21 RX ADMIN — Medication 50 MILLIGRAM(S): at 05:10

## 2021-12-21 RX ADMIN — TAMSULOSIN HYDROCHLORIDE 0.4 MILLIGRAM(S): 0.4 CAPSULE ORAL at 21:51

## 2021-12-21 RX ADMIN — HEPARIN SODIUM 5000 UNIT(S): 5000 INJECTION INTRAVENOUS; SUBCUTANEOUS at 05:09

## 2021-12-21 RX ADMIN — HEPARIN SODIUM 5000 UNIT(S): 5000 INJECTION INTRAVENOUS; SUBCUTANEOUS at 13:10

## 2021-12-21 RX ADMIN — Medication 40 MILLIEQUIVALENT(S): at 13:10

## 2021-12-21 RX ADMIN — BUDESONIDE AND FORMOTEROL FUMARATE DIHYDRATE 2 PUFF(S): 160; 4.5 AEROSOL RESPIRATORY (INHALATION) at 05:09

## 2021-12-21 NOTE — DISCHARGE NOTE PROVIDER - NSDCCPCAREPLAN_GEN_ALL_CORE_FT
PRINCIPAL DISCHARGE DIAGNOSIS  Diagnosis: Sigmoid stricture  Assessment and Plan of Treatment:       SECONDARY DISCHARGE DIAGNOSES  Diagnosis: S/P ileostomy  Assessment and Plan of Treatment:      PRINCIPAL DISCHARGE DIAGNOSIS  Diagnosis: Sigmoid stricture  Assessment and Plan of Treatment: Patient is s/p sigmoidectomy w/ loop ileostomy. Patient tolerated procedure well. Patient's ostomy output is consistent. He is discharged in stable condition.      SECONDARY DISCHARGE DIAGNOSES  Diagnosis: S/P ileostomy  Assessment and Plan of Treatment:

## 2021-12-21 NOTE — PROGRESS NOTE ADULT - SUBJECTIVE AND OBJECTIVE BOX
STATUS POST:  sigmoidectomy    POST OPERATIVE DAY #: 4    SUBJECTIVE:  Examined at bedside this morning, he feels well; his pain is well-controlled. No nausea or vomiting. Tolerating CLD, encouraged to try soft diet. No acute complaints.      MEDICATIONS  (STANDING):  acetaminophen     Tablet .. 1000 milliGRAM(s) Oral every 6 hours  budesonide  80 MICROgram(s)/formoterol 4.5 MICROgram(s) Inhaler 2 Puff(s) Inhalation two times a day  dextrose 5% + sodium chloride 0.45% 1000 milliLiter(s) (100 mL/Hr) IV Continuous <Continuous>  heparin   Injectable 5000 Unit(s) SubCutaneous every 8 hours  loperamide 2 milliGRAM(s) Oral three times a day  metoprolol tartrate 50 milliGRAM(s) Oral two times a day  potassium chloride  10 mEq/100 mL IVPB 10 milliEquivalent(s) IV Intermittent every 1 hour  tamsulosin 0.4 milliGRAM(s) Oral at bedtime    MEDICATIONS  (PRN):  ALBUTerol    90 MICROgram(s) HFA Inhaler 2 Puff(s) Inhalation every 6 hours PRN Shortness of Breath and/or Wheezing  benzocaine 15 mG/menthol 3.6 mG Lozenge 1 Lozenge Oral three times a day PRN Sore Throat  ondansetron Injectable 4 milliGRAM(s) IV Push every 6 hours PRN Nausea      Vital Signs Last 24 Hrs  T(C): 36.8 (21 Dec 2021 05:00), Max: 37.2 (20 Dec 2021 20:45)  T(F): 98.3 (21 Dec 2021 05:00), Max: 98.9 (20 Dec 2021 20:45)  HR: 94 (21 Dec 2021 05:00) (91 - 112)  BP: 134/79 (21 Dec 2021 05:00) (125/85 - 150/97)  BP(mean): 97 (21 Dec 2021 05:00) (97 - 98)  RR: 18 (21 Dec 2021 05:00) (17 - 18)  SpO2: 95% (21 Dec 2021 05:00) (94% - 96%)    PHYSICAL EXAM:    Constitutional: A&Ox3    Respiratory: non labored breathing, no respiratory distress    Cardiovascular: NSR, RRR    Gastrointestinal: abdomen soft, non-tender, less distended, ileostomy with large amount of dark watery fluid                 Incision: clean dry, intact    Genitourinary: voiding, UOP increased from yesterday    Extremities: (-) edema, -calf tenderness                  I&O's Detail    20 Dec 2021 07:01  -  21 Dec 2021 07:00  --------------------------------------------------------  IN:    dextrose 5% + sodium chloride 0.45% w/ Additives: 1400 mL    dextrose 5% + sodium chloride 0.45% w/ Additives: 800 mL    Lactated Ringers Bolus: 1500 mL    Oral Fluid: 360 mL  Total IN: 4060 mL    OUT:    Bulb (mL): 61.5 mL    Ileostomy (mL): 2980 mL    Voided (mL): 810 mL  Total OUT: 3851.5 mL    Total NET: 208.5 mL          LABS:                        14.4   11.46 )-----------( 346      ( 21 Dec 2021 07:47 )             42.3     12-21    133<L>  |  95<L>  |  43<H>  ----------------------------<  165<H>  3.5   |  27  |  1.03    Ca    8.9      21 Dec 2021 07:47  Phos  3.3     12-21  Mg     2.3     12-21

## 2021-12-21 NOTE — DISCHARGE NOTE PROVIDER - NSDCMRMEDTOKEN_GEN_ALL_CORE_FT
albuterol 90 mcg/inh inhalation aerosol: 1 puff(s) inhaled once a day  Breo Ellipta 100 mcg-25 mcg/inh inhalation powder: 1 puff(s) inhaled once a day  Incruse Ellipta 62.5 mcg/inh inhalation powder:   tamsulosin 0.4 mg oral capsule: 1 cap(s) orally once a day   Adapt Barrier Rings 2&quot; #8805: Apply topically to affected area once a day   Adapt Ostomy Belt (Large) #7299:   Adapt Stoma Powder #7906: Apply topically to affected area   albuterol 90 mcg/inh inhalation aerosol: 1 puff(s) inhaled once a day  Breo Ellipta 100 mcg-25 mcg/inh inhalation powder: 1 puff(s) inhaled once a day  [place] Drainable Pouches 2 3/4&quot; #97247: Apply topically to affected area once a day   [place] Skin Barriers (Flat) 2 3/4&quot;&quot; #07583: Apply topically to affected area once a day   Incruse Ellipta 62.5 mcg/inh inhalation powder:   loperamide 2 mg oral capsule: 2 cap(s) orally 3 times a day with meals  nystatin 100,000 units/g topical powder: Apply topically around ostomy to affected area 2 times a week with every ostomy change.   Skin Prep #7917: Apply topically to affected area once a day   tamsulosin 0.4 mg oral capsule: 1 cap(s) orally once a day

## 2021-12-21 NOTE — DISCHARGE NOTE PROVIDER - CARE PROVIDER_API CALL
Shonda Munoz)  ColonRectal Surgery; Surgery  [address] [place] [address], [address]  [place] [address]  Phone: [phone]  Fax: [phone]  Follow Up Time:

## 2021-12-21 NOTE — DISCHARGE NOTE PROVIDER - HOSPITAL COURSE
***INCOMPLETE***    76M PMHx HTN, asthma, recent admission for obstructing sigmoid stricture suspicious for adenocarcinoma, s/p colonoscopy and stent placement, presented on 12/17 for an elective hand-assisted laparoscopic sigmoidectomy with diverting loop ileostomy. ***INCOMPLETE***    76M PMHx HTN, asthma, recent admission for obstructing sigmoid stricture suspicious for adenocarcinoma, s/p colonoscopy and stent placement, presented on 12/17 for an elective hand-assisted laparoscopic sigmoidectomy with diverting loop ileostomy.  The procedure was uncomplicated .  The taylor was dc'd and the patient passed the TIV.  Home meds were restarted.  The postoperative course was complicated by a high ileostomy output and low urine output.  The patient was repleted with LR as necessary in a 0.5:1 ratio to the ileostomy output, and he was started on immodium.  His UOP increased and ileostomy output decreased.  He had 2 episodes of  tachycardia which were resolved with a fluid bolus.  Ostomy teaching was provided for 3 days, and VNS were set up for outpatient ostomy care.  Patient's diet was increased as tolerated.  patient is ambulating.  at time of discharge... ***INCOMPLETE***    76M PMHx HTN, asthma, recent admission for obstructing sigmoid stricture suspicious for adenocarcinoma, s/p colonoscopy and stent placement, presented on 12/17 for an elective hand-assisted laparoscopic sigmoidectomy with diverting loop ileostomy.  The procedure was uncomplicated .  The taylor was dc'd and the patient passed the TOV.  Home meds were restarted.  The postoperative course was complicated by a high ileostomy output and low urine output.  The patient was repleted with LR as necessary in a 0.5:1 ratio to the ileostomy output, and he was started on immodium.  His UOP increased and ileostomy output decreased.  He had 2 episodes of  tachycardia which were resolved with a fluid bolus.  Ostomy teaching was provided for 3 days, and VNS were set up for outpatient ostomy care.  Patient's diet was increased as tolerated.  patient is ambulating.  at time of discharge... ***INCOMPLETE***    76M PMHx HTN, asthma, recent admission for obstructing benign sigmoid stricture, s/p flexible sigmoidoscopy and stent placement, presented on 12/17 for an elective hand-assisted laparoscopic sigmoidectomy with diverting loop ileostomy.  The procedure was uncomplicated .  The taylor was dc'd and the patient passed the TOV.  Home meds were restarted.  The postoperative course was complicated by a high ileostomy output and low urine output.  The patient was repleted with LR as necessary in a 0.5:1 ratio to the ileostomy output, and he was started on immodium.  His UOP increased and ileostomy output decreased.  He had 2 episodes of  tachycardia which were resolved with a fluid bolus.  Ostomy teaching was provided for 3 days, and VNS were set up for outpatient ostomy care.  Patient's diet was increased as tolerated.  patient is ambulating.  at time of discharge... ***INCOMPLETE***    76M PMHx HTN, asthma, recent admission for obstructing benign sigmoid stricture, s/p flexible sigmoidoscopy and stent placement, presented on 12/17 for an elective hand-assisted laparoscopic sigmoidectomy with diverting loop ileostomy.  The procedure was uncomplicated .  The taylor was dc'd and the patient passed the TOV.  Home meds were restarted.  The postoperative course was complicated by a high ileostomy output and low urine output.  The patient was repleted with LR as necessary in a 0.5:1 ratio to the ileostomy output, and he was started on immodium.  His UOP increased and ileostomy output decreased.  He had 2 episodes of  tachycardia which were resolved with a fluid bolus.  Ostomy teaching was provided for 3 days, and VNS were set up for outpatient ostomy care.  Patient's diet was increased as tolerated.  patient was ambulating. on 12/23 his MICK drain was removed. At the time of discharge all vital signs were stable and bloodwork wnl.  Urine output was adequate, ileostomy output was more formed. Patient was tolerating a low fiber diet.  Patient ready to be discharged with plan for outpatient follow up. 76M PMHx HTN, asthma, recent admission for obstructing sigmoid stricture, s/p flexible sigmoidoscopy and stent placement, presented on 12/17 for an elective hand-assisted laparoscopic sigmoidectomy with diverting loop ileostomy.  The procedure was uncomplicated .  The taylor was dc'd and the patient passed the TOV.  Home meds were restarted.  The postoperative course included a high ileostomy output.  The patient was repleted with LR as necessary in a 0.5:1 ratio to the ileostomy output, and he was started on immodium.  His UOP was appropriate and ileostomy output decreased. Ostomy teaching was provided, and VNS were set up for outpatient ostomy care.  Patient's diet was increased as tolerated.  patient was ambulating. on 12/23 his MICK drain was removed. At the time of discharge all vital signs were stable and bloodwork wnl.  Urine output was adequate, ileostomy output was consistent. Patient was tolerating a low fiber diet.  Patient ready to be discharged with plan for outpatient follow up.

## 2021-12-21 NOTE — PROGRESS NOTE ADULT - SUBJECTIVE AND OBJECTIVE BOX
Interventional, Pulmonary, Critical, Chest Special Procedures.    Pt was seen and fully examined by myself.     Time spent with patient in minutes:137    Patient is a 76y old  Male who presents with a chief complaint of sigmoidectomy (21 Dec 2021 12:02 )Asked to manage this high risk pt s/p  abdominal surgery. Events leading to this admission reviewed. The patient now c surgical sites discomfort rather than pain, eupneic when seen, he patient denies any change in his asthma severity, denies any recent systemic steroids, denies vomiting or aspiration, denies any recent URI, no chest trauma, no H/O GAURANG    HPI:  76M PMHx HTN, asthma, recent admission for obstructing sigmoid stricture suspicious for adenocarcinoma, s/p colonoscopy and stent placement, presents today for elective sigmoidectomy. Currently passing flatus and having BMs. Asymptomatic. (17 Dec 2021 12:50)      REVIEW OF SYSTEMS:  Constitutional: No fever, weight loss, chills +++fatigue  Eyes: No eye pain, visual disturbances, or discharge  ENMT:  No difficulty hearing, tinnitus, vertigo; No sinus or throat pain. No epistaxis, dysphagia, dysphonia, hoarseness or odynophagia  Neck: No pain, stiffness or neck swelling.  No masses or deformities  Respiratory: chronic cough, no wheezing, hemoptysis  - COPD  - ILD   - PE   + ASTHMA     - PNEUMONIA  Cardiovascular: No chest pain, dysrhythmia, palpitations, dizziness or edema - CAD   - CHF   + HTN  Gastrointestinal: + abdominal or epigastric pain. + nausea, no vomiting or hematemesis; No diarrhea or constipation. No melena or hematochezia, Icterus.          Genitourinary: No dysuria, frequency, hematuria or incontinence   - CKD/ROSS      - ESRD  Neurological: No headaches, memory loss, loss of strength, numbness or tremors      -DEMENTIA     - STROKE    - SEIZURE  Skin: No itching, burning, rashes or lesions   Lymph Nodes: No enlarged glands  Endocrine: No heat or cold intolerance; No hair loss       - DM     - THYROID DISORDER  Musculoskeletal: No joint pain or swelling; No muscle, back or extremity pain, No edema  Psychiatric: No depression, anxiety, mood swings or difficulty sleeping  Heme/Lymph: No easy bruising or bleeding gums         - ANEMIA      - CANCER   -COAGULOPATHY  Allergy and Immunologic: No hives or eczema    PAST MEDICAL & SURGICAL HISTORY:  Asthma    Hypertension    History of BPH    No significant past surgical history      FAMILY HISTORY:  No pertinent family history in first degree relatives      SOCIAL HISTORY:      - Tobacco     - ETOH    Allergies    No Known Allergies    Intolerances      Vital Signs Last 24 Hrs  T(C): 36.8 (21 Dec 2021 13:52), Max: 37.2 (20 Dec 2021 20:45)  T(F): 98.2 (21 Dec 2021 13:52), Max: 98.9 (20 Dec 2021 20:45)  HR: 98 (21 Dec 2021 13:52) (91 - 112)  BP: 120/75 (21 Dec 2021 13:52) (110/74 - 150/97)  BP(mean): 97 (21 Dec 2021 05:00) (97 - 98)  RR: 18 (21 Dec 2021 13:52) (16 - 18)  SpO2: 99% (21 Dec 2021 13:52) (94% - 99%)    12-20 @ 07:01  -  12-21 @ 07:00  --------------------------------------------------------  IN: 4060 mL / OUT: 3851.5 mL / NET: 208.5 mL    12-21 @ 07:01  -  12-21 @ 15:17  --------------------------------------------------------  IN: 660 mL / OUT: 825 mL / NET: -165 mL          MEDICATIONS:  MEDICATIONS  (STANDING):  acetaminophen     Tablet .. 1000 milliGRAM(s) Oral every 6 hours  budesonide  80 MICROgram(s)/formoterol 4.5 MICROgram(s) Inhaler 2 Puff(s) Inhalation two times a day  dextrose 5% + sodium chloride 0.45% 1000 milliLiter(s) (100 mL/Hr) IV Continuous <Continuous>  heparin   Injectable 5000 Unit(s) SubCutaneous every 8 hours  loperamide 4 milliGRAM(s) Oral every 8 hours  metoprolol tartrate 50 milliGRAM(s) Oral two times a day  potassium chloride  10 mEq/100 mL IVPB 10 milliEquivalent(s) IV Intermittent every 1 hour  tamsulosin 0.4 milliGRAM(s) Oral at bedtime    MEDICATIONS  (PRN):  ALBUTerol    90 MICROgram(s) HFA Inhaler 2 Puff(s) Inhalation every 6 hours PRN Shortness of Breath and/or Wheezing  benzocaine 15 mG/menthol 3.6 mG Lozenge 1 Lozenge Oral three times a day PRN Sore Throat  ondansetron Injectable 4 milliGRAM(s) IV Push every 6 hours PRN Nausea      PHYSICAL EXAM:  Un Comfortable, no immediate distress  Eyes: PERRL, EOM intact; conjunctiva and sclera clear  Head: Normocephalic;  No Trauma  ENMT: No nasal discharge, hoarseness, cough or hemoptysis  Neck: Supple; non tender; no masses or deformities.    No JVD  Respiratory: CTA,  - WHEEZING   - RHONCHI  - RALES  - CRACKLES.  Diminished breath sounds  BILATERAL  RIGHT  LEFT bases c inspiratory splinting   Cardiovascular: Regular rate and rhythm. S1 and S2 Normal; No murmurs, gallops or rubs     - PPM/AICD  Gastrointestinal: Soft obese, mildly tender, +distended; Normal bowel sounds; Colostomy site clean  No hepatosplenomegaly.     -PEG    -  GT   - HYDE  Genitourinary: No costovertebral angle tenderness. No dysuria  Extremities: AROM, No clubbing, cyanosis or edema    Vascular: Peripheral pulses palpable 2+ bilaterally  Neurological: Alert and responisve to stimuli   Skin: Warm and dry. No obvious rash  Lymph Nodes: No acute cervical or supraclavicular adenopathy  Psychiatric: Cooperative and appropriate mood    DEVICES:  - DENTURES   +IV R / L     - ETUBE   -TRACH   -CTUBE  R / L    LABS:                          14.4   11.46 )-----------( 346      ( 21 Dec 2021 07:47 )             42.3     12-21    133<L>  |  95<L>  |  43<H>  ----------------------------<  165<H>  3.5   |  27  |  1.03    Ca    8.9      21 Dec 2021 07:47  Phos  3.3     12-21  Mg     2.3     12-21        RADIOLOGY & ADDITIONAL STUDIES (The following images were personally reviewed):< from: Xray Chest 1 View-PORTABLE IMMEDIATE (Xray Chest 1 View-PORTABLE IMMEDIATE .) (12.20.21 @ 00:01) >  ACC: [id number] EXAM:  XR CHEST PORTABLE IMMED 1V                          PROCEDURE DATE:  12/20/2021          INTERPRETATION:  Clinical history/reason for exam: Shortness of breath    Frontal chest.    COMPARISON: December 6, 2021.    Findings/  impression: Left upper lobe opacity. Bibasilar focal atelectasis/right   pleural effusion, unchanged. Stable heart size, thoracic aortic   calcification. Gaseous distended stomach    < end of copied text >
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
## 2021-12-21 NOTE — DISCHARGE NOTE PROVIDER - NSDCACTIVITY_GEN_ALL_CORE
Showering allowed/Walking - Indoors allowed/No heavy lifting/straining/Walking - Outdoors allowed Return to Work/School allowed/Showering allowed/Stairs allowed/Walking - Indoors allowed/No heavy lifting/straining/Follow Instructions Provided by your Surgical Team
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
## 2021-12-21 NOTE — PROGRESS NOTE ADULT - SUBJECTIVE AND OBJECTIVE BOX
Feels well, tolerating more oral intake.  Ambulated.  Received stoma teaching.  AFVSS  Abd soft, less distended, incisions clean  ileostomy p/p/p, output 2.7L/24h  MICK 60cc SSF  u/o improved, received 1.5L boluses in addition to 100cc/h maintenance    A/P: POD 4 lap sigmoid colectomy.  High stoma output improving.   1. Increase imodium to 4mg tid  2. Keep IV at 100cc/h, monitor ileostomy output.  Can likely stop boluses.    3. Encouraged po intake as tolerated.  4. SQH, SCDs, IS. OOB,  5. Stoma care and teaching  6. VNS for above.

## 2021-12-21 NOTE — DISCHARGE NOTE PROVIDER - NSDCFUADDINST_GEN_ALL_CORE_FT
Please follow up with your surgeon, Dr. Munoz, in 1-2 weeks.  Call his office at the number provided to schedule an appointment.  Please follow instructions provided by the ostomy care nurse in order to care for your ostomy/ileostomy. Please make note of how much output it is making and if there is an increase or decrease in output, contact your surgeon's office to follow up.  Visiting nursing services will be assisting you with your ostomy care.    Medications:  Please continue to take your home medications as prescribed.  Please continue to take 4mg loperamide (imodium) three times per day, with meals, as prescribed.    Pain control:   Please take 2 tabs of ectra strength tylenol every 6 hours as needed for pain. do not exceed 4000 mg of tylenol in 24 hours.     Incision care:  Please call your provider if you have increased pain, swelling, redness, or drainage from incision site.  Avoid swimming and baths until your follow up.  You may shower and wash surgical incisions with mild soap and water. Gently pat dry.    Please continue a low-fiber diet.  -Vegetables should initially be cooked  -Limit fat/oil intake and fried/greasy foods  -Add small amounts of fiber back into diet every couple of days    Please call your provider if you are experiencing:  -New chest pain, pressure, squeezing, or tightness  -New or worsening cough, shortness of breath, or wheeze  -If you are vomiting and cannot keep down fluids or medications  -You are getting dehydrated.  Signs include dry mouth, rapid heartbeat, feeling dizzy or faint when standing  -You experience burning when you urinate, have blood in your urine, or experience a discharge  -Call or return immediately if your pain is getting worse, changes location, or moves to your chest or back  -You have shaking, chills, or fever >100.5F  -Any change in symptoms or new symptoms that concern you Please follow up with your surgeon, Dr. Munoz, in 1-2 weeks.  Call his office at the number provided to schedule an appointment.  Please follow instructions provided by the ostomy care nurse in order to care for your ostomy/ileostomy. Please make note of how much output is collected in your ostomy with each change. If there are any significant changes, contact your surgeon's office.  Visiting nursing services will be assisting you with your ostomy care.    Medications:  Please continue to take your home medications as prescribed.  Please continue to take 4mg loperamide (imodium) three times per day, with meals, as prescribed until otherwise directed by Dr. Munoz.     Pain control:   Please take 2 tabs of extra strength tylenol every 6 hours as needed for pain. do not exceed 4000 mg of tylenol in 24 hours.     Incision care:  Please call your provider if you have increased pain, swelling, redness, or drainage from incision site.  Avoid swimming and baths until your follow up.  You may shower; allow soap and water to flow over surgical wounds, do not scrub, pat dry when finished.     Please continue a low-fiber diet.  -Vegetables should initially be cooked  -Limit fat/oil intake and fried/greasy foods  -Add small amounts of fiber back into diet every couple of days    Please call your provider if you are experiencing:  -New chest pain, pressure, squeezing, or tightness  -New or worsening cough, shortness of breath, or wheeze  -If you are vomiting and cannot keep down fluids or medications  -You are getting dehydrated.  Signs include dry mouth, rapid heartbeat, feeling dizzy or faint when standing  -You experience burning when you urinate, have blood in your urine, or experience a discharge  -Call or return immediately if your pain is getting worse, changes location, or moves to your chest or back  -You have shaking, chills, or fever >100.5F  -Any change in symptoms or new symptoms that concern you

## 2021-12-21 NOTE — PROGRESS NOTE ADULT - ASSESSMENT
ASSESSMENT/PLAN76 yo M PMH of controlled asthma, HTN , now S/P obstructing sigmoid stricture suspicious for adenocarcinoma,now s/p  hand-assisted laparoscopic sigmoidectomy with diverting loop ileostomy (12/17). Lung atelectases. REED opacity favor atelectasis.     1. O2 attempt off   2. Bronchodilators:  Atrovent/ albuterol q 4 – 6 hours as needed  3. Corticosteroids: off systemic   4. ID/Antibiotics: as per surgery   5. Cardiac/HTN: optimize BP  6. GI: Rx/ prophylaxis c PPI/H2B  7. Heme: Rx/VT prophylaxis c SQH/SCD/  8. Aspiration precautions at all times  9. Progress  incentive spirometry  10. replete K as needed  11.Mobilize, OOB, PT   Discussed with managing team,

## 2021-12-22 LAB
ANION GAP SERPL CALC-SCNC: 14 MMOL/L — SIGNIFICANT CHANGE UP (ref 5–17)
BUN SERPL-MCNC: 34 MG/DL — HIGH (ref 7–23)
CALCIUM SERPL-MCNC: 9.1 MG/DL — SIGNIFICANT CHANGE UP (ref 8.4–10.5)
CHLORIDE SERPL-SCNC: 99 MMOL/L — SIGNIFICANT CHANGE UP (ref 96–108)
CO2 SERPL-SCNC: 24 MMOL/L — SIGNIFICANT CHANGE UP (ref 22–31)
CREAT SERPL-MCNC: 1.04 MG/DL — SIGNIFICANT CHANGE UP (ref 0.5–1.3)
GLUCOSE SERPL-MCNC: 157 MG/DL — HIGH (ref 70–99)
HCT VFR BLD CALC: 44.3 % — SIGNIFICANT CHANGE UP (ref 39–50)
HGB BLD-MCNC: 14.8 G/DL — SIGNIFICANT CHANGE UP (ref 13–17)
MAGNESIUM SERPL-MCNC: 2.3 MG/DL — SIGNIFICANT CHANGE UP (ref 1.6–2.6)
MCHC RBC-ENTMCNC: 28 PG — SIGNIFICANT CHANGE UP (ref 27–34)
MCHC RBC-ENTMCNC: 33.4 GM/DL — SIGNIFICANT CHANGE UP (ref 32–36)
MCV RBC AUTO: 83.7 FL — SIGNIFICANT CHANGE UP (ref 80–100)
NRBC # BLD: 0 /100 WBCS — SIGNIFICANT CHANGE UP (ref 0–0)
PHOSPHATE SERPL-MCNC: 2.9 MG/DL — SIGNIFICANT CHANGE UP (ref 2.5–4.5)
PLATELET # BLD AUTO: 355 K/UL — SIGNIFICANT CHANGE UP (ref 150–400)
POTASSIUM SERPL-MCNC: 4.2 MMOL/L — SIGNIFICANT CHANGE UP (ref 3.5–5.3)
POTASSIUM SERPL-SCNC: 4.2 MMOL/L — SIGNIFICANT CHANGE UP (ref 3.5–5.3)
RBC # BLD: 5.29 M/UL — SIGNIFICANT CHANGE UP (ref 4.2–5.8)
RBC # FLD: 14.4 % — SIGNIFICANT CHANGE UP (ref 10.3–14.5)
SODIUM SERPL-SCNC: 137 MMOL/L — SIGNIFICANT CHANGE UP (ref 135–145)
WBC # BLD: 12.74 K/UL — HIGH (ref 3.8–10.5)
WBC # FLD AUTO: 12.74 K/UL — HIGH (ref 3.8–10.5)

## 2021-12-22 RX ORDER — NYSTATIN CREAM 100000 [USP'U]/G
1 CREAM TOPICAL ONCE
Refills: 0 | Status: COMPLETED | OUTPATIENT
Start: 2021-12-22 | End: 2021-12-22

## 2021-12-22 RX ORDER — NYSTATIN CREAM 100000 [USP'U]/G
1 CREAM TOPICAL
Qty: 9 | Refills: 0
Start: 2021-12-22 | End: 2022-01-20

## 2021-12-22 RX ADMIN — Medication 50 MILLIGRAM(S): at 05:15

## 2021-12-22 RX ADMIN — ALBUTEROL 2 PUFF(S): 90 AEROSOL, METERED ORAL at 14:22

## 2021-12-22 RX ADMIN — Medication 50 MILLIGRAM(S): at 18:59

## 2021-12-22 RX ADMIN — HEPARIN SODIUM 5000 UNIT(S): 5000 INJECTION INTRAVENOUS; SUBCUTANEOUS at 12:51

## 2021-12-22 RX ADMIN — HEPARIN SODIUM 5000 UNIT(S): 5000 INJECTION INTRAVENOUS; SUBCUTANEOUS at 21:08

## 2021-12-22 RX ADMIN — BENZOCAINE AND MENTHOL 1 LOZENGE: 5; 1 LIQUID ORAL at 12:52

## 2021-12-22 RX ADMIN — HEPARIN SODIUM 5000 UNIT(S): 5000 INJECTION INTRAVENOUS; SUBCUTANEOUS at 05:17

## 2021-12-22 RX ADMIN — Medication 4 MILLIGRAM(S): at 21:08

## 2021-12-22 RX ADMIN — NYSTATIN CREAM 1 APPLICATION(S): 100000 CREAM TOPICAL at 14:23

## 2021-12-22 RX ADMIN — BENZOCAINE AND MENTHOL 1 LOZENGE: 5; 1 LIQUID ORAL at 05:21

## 2021-12-22 RX ADMIN — BUDESONIDE AND FORMOTEROL FUMARATE DIHYDRATE 2 PUFF(S): 160; 4.5 AEROSOL RESPIRATORY (INHALATION) at 05:16

## 2021-12-22 RX ADMIN — Medication 4 MILLIGRAM(S): at 14:23

## 2021-12-22 RX ADMIN — BUDESONIDE AND FORMOTEROL FUMARATE DIHYDRATE 2 PUFF(S): 160; 4.5 AEROSOL RESPIRATORY (INHALATION) at 18:59

## 2021-12-22 RX ADMIN — ALBUTEROL 2 PUFF(S): 90 AEROSOL, METERED ORAL at 05:21

## 2021-12-22 RX ADMIN — BENZOCAINE AND MENTHOL 1 LOZENGE: 5; 1 LIQUID ORAL at 21:04

## 2021-12-22 RX ADMIN — TAMSULOSIN HYDROCHLORIDE 0.4 MILLIGRAM(S): 0.4 CAPSULE ORAL at 21:08

## 2021-12-22 RX ADMIN — Medication 4 MILLIGRAM(S): at 05:15

## 2021-12-22 NOTE — PROGRESS NOTE ADULT - ATTENDING COMMENTS
See my note Feels well, asking to go home.    No pain. Tolerating more of solid diet.  AFVSS  Abd soft, minimal distention. Incision clean  Ileostomy p/p/p. output 1.5L/24h  MICK 30cc scant SSF  u/p ok  On maintenance IV at 50/h    labs ok    A/P: POD 5 lap sigmoid colectomy. Stoma output improving.  1. Continue Imodium 3 tid.  2. HLIV.  Monitor hydration status.  3. Encouraged po intake, instructed on monitoring color of urine  4. Would like to see stoma output a bit lower prior to discharge  5. OOB, IS  6. Instructed patient and  by phone on postop care.

## 2021-12-22 NOTE — PROGRESS NOTE ADULT - SUBJECTIVE AND OBJECTIVE BOX
PT. seen and examined at bed side.    Case discussed with Surgical team.    Case discussed with Consultants.    Orders reviewed.    Plan:as per orders.      ICU Vital Signs Last 24 Hrs  T(C): 36.7 (22 Dec 2021 20:11), Max: 37 (22 Dec 2021 00:02)  T(F): 98 (22 Dec 2021 20:11), Max: 98.6 (22 Dec 2021 00:02)  HR: 84 (22 Dec 2021 20:11) (84 - 134)  BP: 124/70 (22 Dec 2021 20:11) (119/76 - 136/81)  BP(mean): --  ABP: --  ABP(mean): --  RR: 17 (22 Dec 2021 20:11) (16 - 18)  SpO2: 98% (22 Dec 2021 20:11) (95% - 98%)     REVIEW OF SYSTEMS:    CONSTITUTIONAL: No weakness, fevers or chills  EYES/ENT: No visual changes;  No vertigo or throat pain   NECK: No pain or stiffness  RESPIRATORY: No cough, wheezing, hemoptysis; No shortness of breath  CARDIOVASCULAR: chest pain and palpitations is present  GASTROINTESTINAL: No abdominal or epigastric pain. No nausea, vomiting, or hematemesis; No diarrhea or constipation. No melena or hematochezia.  GENITOURINARY: No dysuria, frequency or hematuria  NEUROLOGICAL: Numbness on chest, left and right arm  SKIN: No itching, burning, rashes, or lesions   All other review of systems is negative unless indicated above.

## 2021-12-22 NOTE — PROGRESS NOTE ADULT - SUBJECTIVE AND OBJECTIVE BOX
STATUS POST:  sigmoidectomy    POST OPERATIVE DAY #: 5    SUBJECTIVE:  Examined at bedside this morning, he feels well; his pain is well-controlled. No nausea or vomiting. Tolerating fruit and soft foods, reports difficulty swallowing dryer more solid foods secondary to a sore throat. Denies CP, SOB, fever/chills.      MEDICATIONS  (STANDING):  acetaminophen     Tablet .. 1000 milliGRAM(s) Oral every 6 hours  benzocaine 15 mG/menthol 3.6 mG Lozenge 1 Lozenge Oral once  budesonide  80 MICROgram(s)/formoterol 4.5 MICROgram(s) Inhaler 2 Puff(s) Inhalation two times a day  dextrose 5% + sodium chloride 0.45% 1000 milliLiter(s) (50 mL/Hr) IV Continuous <Continuous>  heparin   Injectable 5000 Unit(s) SubCutaneous every 8 hours  loperamide 4 milliGRAM(s) Oral every 8 hours  metoprolol tartrate 50 milliGRAM(s) Oral two times a day  potassium chloride  10 mEq/100 mL IVPB 10 milliEquivalent(s) IV Intermittent every 1 hour  tamsulosin 0.4 milliGRAM(s) Oral at bedtime    MEDICATIONS  (PRN):  ALBUTerol    90 MICROgram(s) HFA Inhaler 2 Puff(s) Inhalation every 6 hours PRN Shortness of Breath and/or Wheezing  benzocaine 15 mG/menthol 3.6 mG Lozenge 1 Lozenge Oral three times a day PRN Sore Throat  ondansetron Injectable 4 milliGRAM(s) IV Push every 6 hours PRN Nausea      Vital Signs Last 24 Hrs  T(C): 37 (22 Dec 2021 05:16), Max: 37 (22 Dec 2021 00:02)  T(F): 98.6 (22 Dec 2021 05:16), Max: 98.6 (22 Dec 2021 00:02)  HR: 91 (22 Dec 2021 06:22) (80 - 134)  BP: 121/78 (22 Dec 2021 06:22) (110/74 - 136/81)  BP(mean): --  RR: 16 (22 Dec 2021 06:22) (16 - 18)  SpO2: 97% (22 Dec 2021 06:22) (94% - 99%)    PHYSICAL EXAM:    Constitutional: A&Ox3    Respiratory: non labored breathing, no respiratory distress    Cardiovascular: NSR, RRR    Gastrointestinal: abdomen soft, non-tender, less distended, ileostomy with dark stool                 Incision: clean dry, intact    Genitourinary: voiding appropriately    Extremities: (-) edema, -calf tenderness                  I&O's Detail    21 Dec 2021 07:01  -  22 Dec 2021 07:00  --------------------------------------------------------  IN:    dextrose 5% + sodium chloride 0.45% w/ Additives: 1450 mL    Oral Fluid: 360 mL  Total IN: 1810 mL    OUT:    Bulb (mL): 32.5 mL    Ileostomy (mL): 1550 mL    Voided (mL): 925 mL  Total OUT: 2507.5 mL    Total NET: -697.5 mL          LABS:                        14.8   12.74 )-----------( 355      ( 22 Dec 2021 06:56 )             44.3     12-22    137  |  99  |  34<H>  ----------------------------<  157<H>  4.2   |  24  |  1.04    Ca    9.1      22 Dec 2021 06:56  Phos  2.9     12-22  Mg     2.3     12-22

## 2021-12-22 NOTE — PROGRESS NOTE ADULT - SUBJECTIVE AND OBJECTIVE BOX
Interventional, Pulmonary, Critical, Chest Special Procedures.    Pt was seen and fully examined by myself.     Time spent with patient in minutes: 67    Patient is a 76y old  Male who presents with a chief complaint of sigmoidectomy (22 Dec 2021 07:30)  The patient reports feeling better today, pain well controlled, eupneic on RA. Still some inspiratory splinting.     HPI:  76M PMHx HTN, asthma, recent admission for obstructing sigmoid stricture suspicious for adenocarcinoma, s/p colonoscopy and stent placement, presents today for elective sigmoidectomy. Currently passing flatus and having BMs. Asymptomatic. (17 Dec 2021 12:50)      REVIEW OF SYSTEMS:  Constitutional: No fever, weight loss, chills +++fatigue  Eyes: No eye pain, visual disturbances, or discharge  ENMT:  No difficulty hearing, tinnitus, vertigo; No sinus or throat pain. No epistaxis, dysphagia, dysphonia, hoarseness or odynophagia  Neck: No pain, stiffness or neck swelling.  No masses or deformities  Respiratory: chronic cough, no wheezing, hemoptysis  - COPD  - ILD   - PE   + ASTHMA     - PNEUMONIA  Cardiovascular: No chest pain, dysrhythmia, palpitations, dizziness or edema - CAD   - CHF   + HTN  Gastrointestinal: + abdominal or epigastric pain. + nausea, no vomiting or hematemesis; No diarrhea or constipation. No melena or hematochezia, Icterus.          Genitourinary: No dysuria, frequency, hematuria or incontinence   - CKD/ROSS      - ESRD  Neurological: No headaches, memory loss, loss of strength, numbness or tremors      -DEMENTIA     - STROKE    - SEIZURE  Skin: No itching, burning, rashes or lesions   Lymph Nodes: No enlarged glands  Endocrine: No heat or cold intolerance; No hair loss       - DM     - THYROID DISORDER  Musculoskeletal: No joint pain or swelling; No muscle, back or extremity pain, No edema  Psychiatric: No depression, anxiety, mood swings or difficulty sleeping  Heme/Lymph: No easy bruising or bleeding gums         - ANEMIA      - CANCER   -COAGULOPATHY  Allergy and Immunologic: No hives or eczema    PAST MEDICAL & SURGICAL HISTORY:  Asthma    Hypertension    History of BPH    No significant past surgical history      FAMILY HISTORY:  No pertinent family history in first degree relatives      SOCIAL HISTORY:      - Tobacco     - ETOH    Allergies    No Known Allergies    Intolerances      Vital Signs Last 24 Hrs  T(C): 36.6 (22 Dec 2021 13:31), Max: 37 (22 Dec 2021 00:02)  T(F): 97.9 (22 Dec 2021 13:31), Max: 98.6 (22 Dec 2021 00:02)  HR: 87 (22 Dec 2021 13:31) (80 - 134)  BP: 119/76 (22 Dec 2021 13:31) (115/69 - 136/81)  BP(mean): --  RR: 18 (22 Dec 2021 13:31) (16 - 18)  SpO2: 97% (22 Dec 2021 13:31) (94% - 97%)    12-21 @ 07:01  -  12-22 @ 07:00  --------------------------------------------------------  IN: 1860 mL / OUT: 2507.5 mL / NET: -647.5 mL    12-22 @ 07:01  -  12-22 @ 16:13  --------------------------------------------------------  IN: 400 mL / OUT: 785 mL / NET: -385 mL          MEDICATIONS:  MEDICATIONS  (STANDING):  acetaminophen     Tablet .. 1000 milliGRAM(s) Oral every 6 hours  budesonide  80 MICROgram(s)/formoterol 4.5 MICROgram(s) Inhaler 2 Puff(s) Inhalation two times a day  heparin   Injectable 5000 Unit(s) SubCutaneous every 8 hours  loperamide 4 milliGRAM(s) Oral every 8 hours  metoprolol tartrate 50 milliGRAM(s) Oral two times a day  potassium chloride  10 mEq/100 mL IVPB 10 milliEquivalent(s) IV Intermittent every 1 hour  tamsulosin 0.4 milliGRAM(s) Oral at bedtime    MEDICATIONS  (PRN):  ALBUTerol    90 MICROgram(s) HFA Inhaler 2 Puff(s) Inhalation every 6 hours PRN Shortness of Breath and/or Wheezing  benzocaine 15 mG/menthol 3.6 mG Lozenge 1 Lozenge Oral three times a day PRN Sore Throat  ondansetron Injectable 4 milliGRAM(s) IV Push every 6 hours PRN Nausea      PHYSICAL EXAM:  Un Comfortable, no immediate distress  Eyes: PERRL, EOM intact; conjunctiva and sclera clear  Head: Normocephalic;  No Trauma  ENMT: No nasal discharge, hoarseness, cough or hemoptysis  Neck: Supple; non tender; no masses or deformities.    No JVD  Respiratory: CTA,  - WHEEZING   - RHONCHI  - RALES  - CRACKLES.  Diminished breath sounds  BILATERAL  RIGHT  LEFT bases c inspiratory splinting   Cardiovascular: Regular rate and rhythm. S1 and S2 Normal; No murmurs, gallops or rubs     - PPM/AICD  Gastrointestinal: Soft obese, mildly tender, +distended; Normal bowel sounds; Colostomy site clean  No hepatosplenomegaly.     -PEG    -  GT   - HYDE  Genitourinary: No costovertebral angle tenderness. No dysuria  Extremities: AROM, No clubbing, cyanosis or edema    Vascular: Peripheral pulses palpable 2+ bilaterally  Neurological: Alert and responisve to stimuli   Skin: Warm and dry. No obvious rash  Lymph Nodes: No acute cervical or supraclavicular adenopathy  Psychiatric: Cooperative and appropriate mood    DEVICES:  - DENTURES   +IV R / L     - ETUBE   -TRACH   -CTUBE  R / L    LABS:                          14.8   12.74 )-----------( 355      ( 22 Dec 2021 06:56 )             44.3     12-22    137  |  99  |  34<H>  ----------------------------<  157<H>  4.2   |  24  |  1.04    Ca    9.1      22 Dec 2021 06:56  Phos  2.9     12-22  Mg     2.3     12-22        RADIOLOGY & ADDITIONAL STUDIES (The following images were personally reviewed):
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
## 2021-12-23 ENCOUNTER — TRANSCRIPTION ENCOUNTER (OUTPATIENT)
Age: 76
End: 2021-12-23

## 2021-12-23 VITALS
HEART RATE: 75 BPM | DIASTOLIC BLOOD PRESSURE: 71 MMHG | RESPIRATION RATE: 17 BRPM | SYSTOLIC BLOOD PRESSURE: 115 MMHG | OXYGEN SATURATION: 95 % | TEMPERATURE: 98 F

## 2021-12-23 PROCEDURE — 88307 TISSUE EXAM BY PATHOLOGIST: CPT

## 2021-12-23 PROCEDURE — 85027 COMPLETE CBC AUTOMATED: CPT

## 2021-12-23 PROCEDURE — 94640 AIRWAY INHALATION TREATMENT: CPT

## 2021-12-23 PROCEDURE — 36415 COLL VENOUS BLD VENIPUNCTURE: CPT

## 2021-12-23 PROCEDURE — 83735 ASSAY OF MAGNESIUM: CPT

## 2021-12-23 PROCEDURE — 82962 GLUCOSE BLOOD TEST: CPT

## 2021-12-23 PROCEDURE — 71045 X-RAY EXAM CHEST 1 VIEW: CPT

## 2021-12-23 PROCEDURE — 80048 BASIC METABOLIC PNL TOTAL CA: CPT

## 2021-12-23 PROCEDURE — 84100 ASSAY OF PHOSPHORUS: CPT

## 2021-12-23 PROCEDURE — C1889: CPT

## 2021-12-23 RX ORDER — LOPERAMIDE HCL 2 MG
2 TABLET ORAL
Qty: 180 | Refills: 0
Start: 2021-12-23 | End: 2022-01-21

## 2021-12-23 RX ADMIN — HEPARIN SODIUM 5000 UNIT(S): 5000 INJECTION INTRAVENOUS; SUBCUTANEOUS at 05:01

## 2021-12-23 RX ADMIN — Medication 50 MILLIGRAM(S): at 05:01

## 2021-12-23 RX ADMIN — Medication 1000 MILLIGRAM(S): at 06:38

## 2021-12-23 RX ADMIN — BUDESONIDE AND FORMOTEROL FUMARATE DIHYDRATE 2 PUFF(S): 160; 4.5 AEROSOL RESPIRATORY (INHALATION) at 05:08

## 2021-12-23 RX ADMIN — Medication 4 MILLIGRAM(S): at 05:01

## 2021-12-23 RX ADMIN — Medication 1000 MILLIGRAM(S): at 07:15

## 2021-12-23 NOTE — PROGRESS NOTE ADULT - PROVIDER SPECIALTY LIST ADULT
Colorectal Surgery
Colorectal Surgery
Internal Medicine
Surgery
Surgery
Colorectal Surgery
Colorectal Surgery
Internal Medicine
Surgery
Surgery
Colorectal Surgery
Internal Medicine
Pulmonology
Pulmonology
Surgery
Surgery

## 2021-12-23 NOTE — DISCHARGE NOTE NURSING/CASE MANAGEMENT/SOCIAL WORK - NSDCPEFALRISK_GEN_ALL_CORE
For information on Fall & Injury Prevention, visit: [email or web address] OR  [email or web address] OR  https://www.cdc.gov/steadi/patient.html
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
## 2021-12-23 NOTE — PROGRESS NOTE ADULT - REASON FOR ADMISSION
sigmoidectomy

## 2021-12-23 NOTE — PROGRESS NOTE ADULT - SUBJECTIVE AND OBJECTIVE BOX
STATUS POST:  sigmoidectomy with diverting loop ileostomy    POST OPERATIVE DAY #: 6    SUBJECTIVE:  Examined at bedside this morning, he feels well; his pain is well-controlled. He is tolerating his diet without nausea or vomiting. No acute complaints. Denies CP, SOB, fever/chills.    MEDICATIONS  (STANDING):  acetaminophen     Tablet .. 1000 milliGRAM(s) Oral every 6 hours  budesonide  80 MICROgram(s)/formoterol 4.5 MICROgram(s) Inhaler 2 Puff(s) Inhalation two times a day  heparin   Injectable 5000 Unit(s) SubCutaneous every 8 hours  loperamide 4 milliGRAM(s) Oral every 8 hours  metoprolol tartrate 50 milliGRAM(s) Oral two times a day  potassium chloride  10 mEq/100 mL IVPB 10 milliEquivalent(s) IV Intermittent every 1 hour  tamsulosin 0.4 milliGRAM(s) Oral at bedtime    MEDICATIONS  (PRN):  ALBUTerol    90 MICROgram(s) HFA Inhaler 2 Puff(s) Inhalation every 6 hours PRN Shortness of Breath and/or Wheezing  benzocaine 15 mG/menthol 3.6 mG Lozenge 1 Lozenge Oral three times a day PRN Sore Throat  ondansetron Injectable 4 milliGRAM(s) IV Push every 6 hours PRN Nausea      Vital Signs Last 24 Hrs  T(C): 36.6 (23 Dec 2021 08:05), Max: 36.7 (22 Dec 2021 17:00)  T(F): 97.9 (23 Dec 2021 08:05), Max: 98 (22 Dec 2021 17:00)  HR: 75 (23 Dec 2021 08:05) (75 - 100)  BP: 115/71 (23 Dec 2021 08:05) (113/78 - 127/78)  BP(mean): --  RR: 17 (23 Dec 2021 08:05) (17 - 18)  SpO2: 95% (23 Dec 2021 08:05) (95% - 98%)    PHYSICAL EXAM:    Constitutional: A&Ox3    Respiratory: non labored breathing, no respiratory distress    Cardiovascular: NSR, RRR    Gastrointestinal: Abdomen soft, non-tender, non-distended, no guarding/rebound, ileostomy with stool in bag                 Incision: clean, dry intact    Genitourinary: voiding appropriately    Extremities: (-) edema, -calf tenderness                  I&O's Detail    22 Dec 2021 07:01  -  23 Dec 2021 07:00  --------------------------------------------------------  IN:    Oral Fluid: 400 mL  Total IN: 400 mL    OUT:    Bulb (mL): 15 mL    Ileostomy (mL): 1575 mL    Voided (mL): 1700 mL  Total OUT: 3290 mL    Total NET: -2890 mL          LABS:                        14.8   12.74 )-----------( 355      ( 22 Dec 2021 06:56 )             44.3     12-22    137  |  99  |  34<H>  ----------------------------<  157<H>  4.2   |  24  |  1.04    Ca    9.1      22 Dec 2021 06:56  Phos  2.9     12-22  Mg     2.3     12-22

## 2021-12-23 NOTE — DISCHARGE NOTE NURSING/CASE MANAGEMENT/SOCIAL WORK - PATIENT PORTAL LINK FT
You can access the FollowMyHealth Patient Portal offered by [facility] by registering at the following website: [email or web address]. By joining [employer]’s FollowMyHealth portal, you will also be able to view your health information using other applications (apps) compatible with our system.

## 2021-12-23 NOTE — ADVANCED PRACTICE NURSE CONSULT - RECOMMEDATIONS
No other needs at this time.
Will continue to follow for ostomy education
Appliance change, education review
Pt to be discharged tomorrow.
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
## 2021-12-28 LAB — SURGICAL PATHOLOGY STUDY: SIGNIFICANT CHANGE UP

## 2021-12-30 PROCEDURE — 99285 EMERGENCY DEPT VISIT HI MDM: CPT | Mod: 25

## 2021-12-30 PROCEDURE — 86850 RBC ANTIBODY SCREEN: CPT

## 2021-12-30 PROCEDURE — 86900 BLOOD TYPING SEROLOGIC ABO: CPT

## 2021-12-30 PROCEDURE — C1876: CPT

## 2021-12-30 PROCEDURE — 80053 COMPREHEN METABOLIC PANEL: CPT

## 2021-12-30 PROCEDURE — 85610 PROTHROMBIN TIME: CPT

## 2021-12-30 PROCEDURE — 96376 TX/PRO/DX INJ SAME DRUG ADON: CPT

## 2021-12-30 PROCEDURE — 85730 THROMBOPLASTIN TIME PARTIAL: CPT

## 2021-12-30 PROCEDURE — 86803 HEPATITIS C AB TEST: CPT

## 2021-12-30 PROCEDURE — 96374 THER/PROPH/DIAG INJ IV PUSH: CPT

## 2021-12-30 PROCEDURE — 85025 COMPLETE CBC W/AUTO DIFF WBC: CPT

## 2021-12-30 PROCEDURE — 71046 X-RAY EXAM CHEST 2 VIEWS: CPT

## 2021-12-30 PROCEDURE — 86901 BLOOD TYPING SEROLOGIC RH(D): CPT

## 2021-12-30 PROCEDURE — 83735 ASSAY OF MAGNESIUM: CPT

## 2021-12-30 PROCEDURE — 84100 ASSAY OF PHOSPHORUS: CPT

## 2021-12-30 PROCEDURE — 81003 URINALYSIS AUTO W/O SCOPE: CPT

## 2021-12-30 PROCEDURE — 87635 SARS-COV-2 COVID-19 AMP PRB: CPT

## 2021-12-30 PROCEDURE — 83690 ASSAY OF LIPASE: CPT

## 2021-12-30 PROCEDURE — 74019 RADEX ABDOMEN 2 VIEWS: CPT

## 2021-12-30 PROCEDURE — 93306 TTE W/DOPPLER COMPLETE: CPT

## 2021-12-30 PROCEDURE — 99283 EMERGENCY DEPT VISIT LOW MDM: CPT

## 2021-12-30 PROCEDURE — 80048 BASIC METABOLIC PNL TOTAL CA: CPT

## 2021-12-30 PROCEDURE — 36415 COLL VENOUS BLD VENIPUNCTURE: CPT

## 2021-12-30 PROCEDURE — 74177 CT ABD & PELVIS W/CONTRAST: CPT | Mod: MB

## 2021-12-30 PROCEDURE — 94640 AIRWAY INHALATION TREATMENT: CPT

## 2021-12-30 PROCEDURE — 96375 TX/PRO/DX INJ NEW DRUG ADDON: CPT

## 2021-12-30 PROCEDURE — 85027 COMPLETE CBC AUTOMATED: CPT

## 2022-10-27 NOTE — PATIENT PROFILE ADULT - DOES PATIENT HAVE ADVANCE DIRECTIVE
LACY spoke with Leroy ([place]) who informed LACY that she submitted patient's clinicals this morning to [facility], and she will contact LACY back once receiving answer/decision from [facility].    Yes

## 2022-11-19 NOTE — ED ADULT NURSE NOTE - NS ED NURSE DISCH DISPOSITION
Problem: Potential for Falls  Goal: Patient will remain free of falls  Description: INTERVENTIONS:  - Educate patient/family on patient safety including physical limitations  - Instruct patient to call for assistance with activity   - Consult OT/PT to assist with strengthening/mobility   - Keep Call bell within reach  - Keep bed low and locked with side rails adjusted as appropriate  - Keep care items and personal belongings within reach  - Initiate and maintain comfort rounds  - Make Fall Risk Sign visible to staff  - Offer Toileting every 2 Hours, in advance of need  - Initiate/Maintain bed alarm  - Obtain necessary fall risk management equipment: bed rails  - Apply yellow socks and bracelet for high fall risk patients  - Consider moving patient to room near nurses station  Outcome: Progressing     Problem: PAIN - ADULT  Goal: Verbalizes/displays adequate comfort level or baseline comfort level  Description: Interventions:  - Encourage patient to monitor pain and request assistance  - Assess pain using appropriate pain scale  - Administer analgesics based on type and severity of pain and evaluate response  - Implement non-pharmacological measures as appropriate and evaluate response  - Consider cultural and social influences on pain and pain management  - Notify physician/advanced practitioner if interventions unsuccessful or patient reports new pain  Outcome: Progressing     Problem: INFECTION - ADULT  Goal: Absence or prevention of progression during hospitalization  Description: INTERVENTIONS:  - Assess and monitor for signs and symptoms of infection  - Monitor lab/diagnostic results  - Monitor all insertion sites, i e  indwelling lines, tubes, and drains  - Monitor endotracheal if appropriate and nasal secretions for changes in amount and color  - [place] appropriate cooling/warming therapies per order  - Administer medications as ordered  - Instruct and encourage patient and family to use good hand hygiene technique  - Identify and instruct in appropriate isolation precautions for identified infection/condition  Outcome: Progressing     Problem: SAFETY ADULT  Goal: Patient will remain free of falls  Description: INTERVENTIONS:  - Educate patient/family on patient safety including physical limitations  - Instruct patient to call for assistance with activity   - Consult OT/PT to assist with strengthening/mobility   - Keep Call bell within reach  - Keep bed low and locked with side rails adjusted as appropriate  - Keep care items and personal belongings within reach  - Initiate and maintain comfort rounds  - Make Fall Risk Sign visible to staff  - Offer Toileting every 2 Hours, in advance of need  - Initiate/Maintain bed alarm  - Obtain necessary fall risk management equipment: bed rails  - Apply yellow socks and bracelet for high fall risk patients  - Consider moving patient to room near nurses station  Outcome: Progressing  Goal: Maintain or return to baseline ADL function  Description: INTERVENTIONS:  -  Assess patient's ability to carry out ADLs; assess patient's baseline for ADL function and identify physical deficits which impact ability to perform ADLs (bathing, care of mouth/teeth, toileting, grooming, dressing, etc )  - Assess/evaluate cause of self-care deficits   - Assess range of motion  - Assess patient's mobility; develop plan if impaired  - Assess patient's need for assistive devices and provide as appropriate  - Encourage maximum independence but intervene and supervise when necessary  - Involve family in performance of ADLs  - Assess for home care needs following discharge   - Consider OT consult to assist with ADL evaluation and planning for discharge  - Provide patient education as appropriate  Outcome: Progressing  Goal: Maintains/Returns to pre admission functional level  Description: INTERVENTIONS:  - Perform BMAT or MOVE assessment daily    - Set and communicate daily mobility goal to care team and patient/family/caregiver  - Collaborate with rehabilitation services on mobility goals if consulted  - Dangle patient 4 times a day  - Stand patient 4 times a day  - Ambulate patient 3 times a day  - Out of bed to chair 3 times a day   - Out of bed for meals 3 times a day  - Out of bed for toileting  - Record patient progress and toleration of activity level   Outcome: Progressing     Problem: DISCHARGE PLANNING  Goal: Discharge to home or other facility with appropriate resources  Description: INTERVENTIONS:  - Identify barriers to discharge w/patient and caregiver  - Arrange for needed discharge resources and transportation as appropriate  - Identify discharge learning needs (meds, wound care, etc )  - Arrange for interpretive services to assist at discharge as needed  - Refer to Case Management Department for coordinating discharge planning if the patient needs post-hospital services based on physician/advanced practitioner order or complex needs related to functional status, cognitive ability, or social support system  Outcome: Progressing     Problem: Knowledge Deficit  Goal: Patient/family/caregiver demonstrates understanding of disease process, treatment plan, medications, and discharge instructions  Description: Complete learning assessment and assess knowledge base    Interventions:  - Provide teaching at level of understanding  - Provide teaching via preferred learning methods  Outcome: Progressing     Problem: RESPIRATORY - ADULT  Goal: Achieves optimal ventilation and oxygenation  Description: INTERVENTIONS:  - Assess for changes in respiratory status  - Assess for changes in mentation and behavior  - Position to facilitate oxygenation and minimize respiratory effort  - Oxygen administered by appropriate delivery if ordered  - Initiate smoking cessation education as indicated  - Encourage broncho-pulmonary hygiene including cough, deep breathe, Incentive Spirometry  - Assess the need for suctioning and aspirate as needed  - Assess and instruct to report SOB or any respiratory difficulty  - Respiratory Therapy support as indicated  Outcome: Progressing     Problem: METABOLIC, FLUID AND ELECTROLYTES - ADULT  Goal: Electrolytes maintained within normal limits  Description: INTERVENTIONS:  - Monitor labs and assess patient for signs and symptoms of electrolyte imbalances  - Administer electrolyte replacement as ordered  - Monitor response to electrolyte replacements, including repeat lab results as appropriate  - Instruct patient on fluid and nutrition as appropriate  Outcome: Progressing  Goal: Fluid balance maintained  Description: INTERVENTIONS:  - Monitor labs   - Monitor I/O and WT  - Instruct patient on fluid and nutrition as appropriate  - Assess for signs & symptoms of volume excess or deficit  Outcome: Progressing  Goal: Glucose maintained within target range  Description: INTERVENTIONS:  - Monitor Blood Glucose as ordered  - Assess for signs and symptoms of hyperglycemia and hypoglycemia  - Administer ordered medications to maintain glucose within target range  - Assess nutritional intake and initiate nutrition service referral as needed  Outcome: Progressing     Problem: HEMATOLOGIC - ADULT  Goal: Maintains hematologic stability  Description: INTERVENTIONS  - Assess for signs and symptoms of bleeding or hemorrhage  - Monitor labs  - Administer supportive blood products/factors as ordered and appropriate  Outcome: Progressing Discharged

## 2023-07-27 NOTE — ED ADULT NURSE NOTE - CAS EDN INTEG ASSESS
[FreeTextEntry1] : A portion of this note was written by [Jamaal Mosqueda] on 07/25/2023 acting as a scribe for Dr. Yap. \par \par I have personally reviewed the chart and agree that the record accurately reflects my personal performance of the history, physical exam, assessment, and plan.\par 
WDL

## 2023-08-31 NOTE — PRE-OP CHECKLIST - NOTHING BY MOUTH SINCE
08-Dec-2021 23:59 Dapsone Counseling: I discussed with the patient the risks of dapsone including but not limited to hemolytic anemia, agranulocytosis, rashes, methemoglobinemia, kidney failure, peripheral neuropathy, headaches, GI upset, and liver toxicity.  Patients who start dapsone require monitoring including baseline LFTs and weekly CBCs for the first month, then every month thereafter.  The patient verbalized understanding of the proper use and possible adverse effects of dapsone.  All of the patient's questions and concerns were addressed.

## 2024-05-20 NOTE — ED PROVIDER NOTE - CADM POA PRESS ULCER
Patient called in and would like to know the results of his sleep study from 4 weeks ago and where he should go from here. Please advise   No

## 2024-11-19 NOTE — HISTORY OF PRESENT ILLNESS
[FreeTextEntry1] : 07/01/2019: Asked to evaluate patient by Dr Jasper Sutherland for obstructive lung disease. Stated hx asthma since 9/11, usually seen at [facility] and treated w Breo and Spiriva. For a month cough, wheeze, producing light sputum, finally seen in ED and given antibiotic and prednisone and Ventolin. Since then he feels good. Congestion has improved. As a child he did have asthma and allergies but outgrew this, but recurred after 9/11. He worked for the city in the area. He quit smoking 35 years ago from 3ppd, about 20 years. Prior to this exacerbation, his last one was several years ago.\par  Detail Level: Detailed